# Patient Record
Sex: FEMALE | Race: WHITE | NOT HISPANIC OR LATINO | Employment: OTHER | URBAN - METROPOLITAN AREA
[De-identification: names, ages, dates, MRNs, and addresses within clinical notes are randomized per-mention and may not be internally consistent; named-entity substitution may affect disease eponyms.]

---

## 2017-05-12 DIAGNOSIS — Z13.820 ENCOUNTER FOR SCREENING FOR OSTEOPOROSIS: ICD-10-CM

## 2017-05-12 DIAGNOSIS — Z79.01 LONG TERM CURRENT USE OF ANTICOAGULANT: ICD-10-CM

## 2017-05-12 DIAGNOSIS — E78.2 MIXED HYPERLIPIDEMIA: ICD-10-CM

## 2017-05-12 DIAGNOSIS — D68.4 ACQUIRED COAGULATION FACTOR DEFICIENCY (HCC): ICD-10-CM

## 2017-05-12 DIAGNOSIS — R73.09 OTHER ABNORMAL GLUCOSE: ICD-10-CM

## 2017-05-12 DIAGNOSIS — R42 DIZZINESS AND GIDDINESS: ICD-10-CM

## 2017-05-12 DIAGNOSIS — I10 ESSENTIAL (PRIMARY) HYPERTENSION: ICD-10-CM

## 2017-05-23 ENCOUNTER — ALLSCRIPTS OFFICE VISIT (OUTPATIENT)
Dept: OTHER | Facility: OTHER | Age: 82
End: 2017-05-23

## 2017-05-26 ENCOUNTER — ALLSCRIPTS OFFICE VISIT (OUTPATIENT)
Dept: OTHER | Facility: OTHER | Age: 82
End: 2017-05-26

## 2017-07-11 ENCOUNTER — ALLSCRIPTS OFFICE VISIT (OUTPATIENT)
Dept: OTHER | Facility: OTHER | Age: 82
End: 2017-07-11

## 2017-10-02 DIAGNOSIS — D68.51 ACTIVATED PROTEIN C RESISTANCE (HCC): ICD-10-CM

## 2017-10-02 DIAGNOSIS — R73.09 OTHER ABNORMAL GLUCOSE: ICD-10-CM

## 2017-10-02 DIAGNOSIS — M15.9 POLYOSTEOARTHRITIS: ICD-10-CM

## 2017-10-02 DIAGNOSIS — R63.4 ABNORMAL WEIGHT LOSS: ICD-10-CM

## 2017-10-02 DIAGNOSIS — Z79.01 LONG TERM CURRENT USE OF ANTICOAGULANT: ICD-10-CM

## 2017-10-02 DIAGNOSIS — R20.0 ANESTHESIA OF SKIN: ICD-10-CM

## 2017-10-02 DIAGNOSIS — I10 ESSENTIAL (PRIMARY) HYPERTENSION: ICD-10-CM

## 2017-10-02 DIAGNOSIS — E78.2 MIXED HYPERLIPIDEMIA: ICD-10-CM

## 2017-10-18 ENCOUNTER — ALLSCRIPTS OFFICE VISIT (OUTPATIENT)
Dept: OTHER | Facility: OTHER | Age: 82
End: 2017-10-18

## 2017-12-15 ENCOUNTER — GENERIC CONVERSION - ENCOUNTER (OUTPATIENT)
Dept: OTHER | Facility: OTHER | Age: 82
End: 2017-12-15

## 2018-01-03 DIAGNOSIS — D68.51 ACTIVATED PROTEIN C RESISTANCE (HCC): ICD-10-CM

## 2018-01-12 VITALS
DIASTOLIC BLOOD PRESSURE: 70 MMHG | SYSTOLIC BLOOD PRESSURE: 130 MMHG | OXYGEN SATURATION: 97 % | WEIGHT: 131.56 LBS | TEMPERATURE: 97.4 F | HEIGHT: 63 IN | BODY MASS INDEX: 23.31 KG/M2 | RESPIRATION RATE: 17 BRPM | HEART RATE: 108 BPM

## 2018-01-13 VITALS
SYSTOLIC BLOOD PRESSURE: 100 MMHG | RESPIRATION RATE: 15 BRPM | OXYGEN SATURATION: 95 % | HEIGHT: 63 IN | HEART RATE: 100 BPM | TEMPERATURE: 99 F | WEIGHT: 126.13 LBS | BODY MASS INDEX: 22.35 KG/M2 | DIASTOLIC BLOOD PRESSURE: 60 MMHG

## 2018-01-13 VITALS
SYSTOLIC BLOOD PRESSURE: 140 MMHG | WEIGHT: 132.44 LBS | RESPIRATION RATE: 16 BRPM | BODY MASS INDEX: 23.46 KG/M2 | DIASTOLIC BLOOD PRESSURE: 70 MMHG | OXYGEN SATURATION: 96 % | HEART RATE: 94 BPM | TEMPERATURE: 96.7 F | HEIGHT: 63 IN

## 2018-01-15 VITALS
RESPIRATION RATE: 16 BRPM | HEART RATE: 105 BPM | SYSTOLIC BLOOD PRESSURE: 134 MMHG | BODY MASS INDEX: 16.36 KG/M2 | WEIGHT: 124 LBS | DIASTOLIC BLOOD PRESSURE: 70 MMHG | TEMPERATURE: 99 F | OXYGEN SATURATION: 96 %

## 2018-01-19 LAB — INR PPP: 1.6 (ref 0.86–1.16)

## 2018-01-22 ENCOUNTER — GENERIC CONVERSION - ENCOUNTER (OUTPATIENT)
Dept: OTHER | Facility: OTHER | Age: 83
End: 2018-01-22

## 2018-02-03 DIAGNOSIS — D68.9 COAGULATION DEFECT (HCC): Primary | ICD-10-CM

## 2018-02-04 RX ORDER — WARFARIN SODIUM 1 MG/1
TABLET ORAL
Qty: 72 TABLET | Refills: 5 | Status: ON HOLD | OUTPATIENT
Start: 2018-02-04 | End: 2018-03-15

## 2018-02-18 DIAGNOSIS — G89.29 OTHER CHRONIC PAIN: Primary | ICD-10-CM

## 2018-02-18 RX ORDER — TRAMADOL HYDROCHLORIDE 50 MG/1
TABLET ORAL
Qty: 30 TABLET | Refills: 0 | OUTPATIENT
Start: 2018-02-18 | End: 2018-03-20 | Stop reason: SDUPTHER

## 2018-03-02 DIAGNOSIS — F41.9 ANXIETY: Primary | ICD-10-CM

## 2018-03-03 RX ORDER — LORAZEPAM 1 MG/1
TABLET ORAL
Qty: 60 TABLET | Refills: 0 | OUTPATIENT
Start: 2018-03-03 | End: 2018-03-13 | Stop reason: SDUPTHER

## 2018-03-13 DIAGNOSIS — F41.9 ANXIETY: ICD-10-CM

## 2018-03-14 ENCOUNTER — TELEPHONE (OUTPATIENT)
Dept: FAMILY MEDICINE CLINIC | Facility: CLINIC | Age: 83
End: 2018-03-14

## 2018-03-14 ENCOUNTER — APPOINTMENT (EMERGENCY)
Dept: RADIOLOGY | Facility: HOSPITAL | Age: 83
DRG: 813 | End: 2018-03-14
Payer: MEDICARE

## 2018-03-14 ENCOUNTER — HOSPITAL ENCOUNTER (INPATIENT)
Facility: HOSPITAL | Age: 83
LOS: 1 days | Discharge: HOME | DRG: 813 | End: 2018-03-15
Attending: EMERGENCY MEDICINE | Admitting: INTERNAL MEDICINE
Payer: MEDICARE

## 2018-03-14 DIAGNOSIS — D68.9 COAGULATION DEFECT (HCC): ICD-10-CM

## 2018-03-14 DIAGNOSIS — R79.1 ELEVATED INR: Primary | ICD-10-CM

## 2018-03-14 DIAGNOSIS — K92.2 GI BLEED: ICD-10-CM

## 2018-03-14 PROBLEM — J45.909 ASTHMA: Status: ACTIVE | Noted: 2018-03-14

## 2018-03-14 PROBLEM — D68.51 FACTOR V LEIDEN (HCC): Status: ACTIVE | Noted: 2018-03-14

## 2018-03-14 LAB
ABO GROUP BLD: NORMAL
ALBUMIN SERPL BCP-MCNC: 2.8 G/DL (ref 3.5–5)
ALP SERPL-CCNC: 86 U/L (ref 46–116)
ALT SERPL W P-5'-P-CCNC: 14 U/L (ref 12–78)
ANION GAP SERPL CALCULATED.3IONS-SCNC: 11 MMOL/L (ref 4–13)
APTT PPP: 123 SECONDS (ref 23–35)
AST SERPL W P-5'-P-CCNC: 24 U/L (ref 5–45)
ATRIAL RATE: 94 BPM
BASOPHILS # BLD AUTO: 0.01 THOUSANDS/ΜL (ref 0–0.1)
BASOPHILS NFR BLD AUTO: 0 % (ref 0–1)
BILIRUB SERPL-MCNC: 0.82 MG/DL (ref 0.2–1)
BLD GP AB SCN SERPL QL: NEGATIVE
BUN SERPL-MCNC: 31 MG/DL (ref 5–25)
CALCIUM SERPL-MCNC: 8.4 MG/DL (ref 8.3–10.1)
CHLORIDE SERPL-SCNC: 104 MMOL/L (ref 100–108)
CO2 SERPL-SCNC: 25 MMOL/L (ref 21–32)
CREAT SERPL-MCNC: 1.06 MG/DL (ref 0.6–1.3)
EOSINOPHIL # BLD AUTO: 0.14 THOUSAND/ΜL (ref 0–0.61)
EOSINOPHIL NFR BLD AUTO: 2 % (ref 0–6)
ERYTHROCYTE [DISTWIDTH] IN BLOOD BY AUTOMATED COUNT: 15.8 % (ref 11.6–15.1)
GFR SERPL CREATININE-BSD FRML MDRD: 46 ML/MIN/1.73SQ M
GLUCOSE SERPL-MCNC: 104 MG/DL (ref 65–140)
HCT VFR BLD AUTO: 28.6 % (ref 34.8–46.1)
HGB BLD-MCNC: 9.2 G/DL (ref 11.5–15.4)
INR PPP: 12.07 (ref 0.86–1.16)
INR PPP: 3.75 (ref 0.86–1.16)
LYMPHOCYTES # BLD AUTO: 2.12 THOUSANDS/ΜL (ref 0.6–4.47)
LYMPHOCYTES NFR BLD AUTO: 30 % (ref 14–44)
MCH RBC QN AUTO: 31.3 PG (ref 26.8–34.3)
MCHC RBC AUTO-ENTMCNC: 32.2 G/DL (ref 31.4–37.4)
MCV RBC AUTO: 97 FL (ref 82–98)
MONOCYTES # BLD AUTO: 0.66 THOUSAND/ΜL (ref 0.17–1.22)
MONOCYTES NFR BLD AUTO: 9 % (ref 4–12)
NEUTROPHILS # BLD AUTO: 4.16 THOUSANDS/ΜL (ref 1.85–7.62)
NEUTS SEG NFR BLD AUTO: 59 % (ref 43–75)
NRBC BLD AUTO-RTO: 0 /100 WBCS
P AXIS: 60 DEGREES
PLATELET # BLD AUTO: 222 THOUSANDS/UL (ref 149–390)
PMV BLD AUTO: 9.3 FL (ref 8.9–12.7)
POTASSIUM SERPL-SCNC: 3.5 MMOL/L (ref 3.5–5.3)
PR INTERVAL: 134 MS
PROT SERPL-MCNC: 6.2 G/DL (ref 6.4–8.2)
PROTHROMBIN TIME: 37.7 SECONDS (ref 12.1–14.4)
PROTHROMBIN TIME: 95.3 SECONDS (ref 12.1–14.4)
QRS AXIS: 52 DEGREES
QRSD INTERVAL: 72 MS
QT INTERVAL: 346 MS
QTC INTERVAL: 432 MS
RBC # BLD AUTO: 2.94 MILLION/UL (ref 3.81–5.12)
RH BLD: POSITIVE
SODIUM SERPL-SCNC: 140 MMOL/L (ref 136–145)
SPECIMEN EXPIRATION DATE: NORMAL
T WAVE AXIS: 23 DEGREES
TROPONIN I SERPL-MCNC: <0.02 NG/ML
VENTRICULAR RATE: 94 BPM
WBC # BLD AUTO: 7.09 THOUSAND/UL (ref 4.31–10.16)

## 2018-03-14 PROCEDURE — 99284 EMERGENCY DEPT VISIT MOD MDM: CPT

## 2018-03-14 PROCEDURE — 93005 ELECTROCARDIOGRAM TRACING: CPT

## 2018-03-14 PROCEDURE — 84484 ASSAY OF TROPONIN QUANT: CPT | Performed by: EMERGENCY MEDICINE

## 2018-03-14 PROCEDURE — 86901 BLOOD TYPING SEROLOGIC RH(D): CPT | Performed by: EMERGENCY MEDICINE

## 2018-03-14 PROCEDURE — 30233K1 TRANSFUSION OF NONAUTOLOGOUS FROZEN PLASMA INTO PERIPHERAL VEIN, PERCUTANEOUS APPROACH: ICD-10-PCS | Performed by: INTERNAL MEDICINE

## 2018-03-14 PROCEDURE — 80053 COMPREHEN METABOLIC PANEL: CPT | Performed by: EMERGENCY MEDICINE

## 2018-03-14 PROCEDURE — 86850 RBC ANTIBODY SCREEN: CPT | Performed by: EMERGENCY MEDICINE

## 2018-03-14 PROCEDURE — 36415 COLL VENOUS BLD VENIPUNCTURE: CPT | Performed by: EMERGENCY MEDICINE

## 2018-03-14 PROCEDURE — 71046 X-RAY EXAM CHEST 2 VIEWS: CPT

## 2018-03-14 PROCEDURE — 85610 PROTHROMBIN TIME: CPT | Performed by: EMERGENCY MEDICINE

## 2018-03-14 PROCEDURE — 85025 COMPLETE CBC W/AUTO DIFF WBC: CPT | Performed by: EMERGENCY MEDICINE

## 2018-03-14 PROCEDURE — P9017 PLASMA 1 DONOR FRZ W/IN 8 HR: HCPCS

## 2018-03-14 PROCEDURE — 99223 1ST HOSP IP/OBS HIGH 75: CPT | Performed by: INTERNAL MEDICINE

## 2018-03-14 PROCEDURE — 86927 PLASMA FRESH FROZEN: CPT

## 2018-03-14 PROCEDURE — 85610 PROTHROMBIN TIME: CPT | Performed by: PHYSICIAN ASSISTANT

## 2018-03-14 PROCEDURE — 93010 ELECTROCARDIOGRAM REPORT: CPT | Performed by: INTERNAL MEDICINE

## 2018-03-14 PROCEDURE — 86900 BLOOD TYPING SEROLOGIC ABO: CPT | Performed by: EMERGENCY MEDICINE

## 2018-03-14 PROCEDURE — 85730 THROMBOPLASTIN TIME PARTIAL: CPT | Performed by: EMERGENCY MEDICINE

## 2018-03-14 RX ORDER — TRAMADOL HYDROCHLORIDE 50 MG/1
50 TABLET ORAL DAILY PRN
Status: DISCONTINUED | OUTPATIENT
Start: 2018-03-14 | End: 2018-03-15 | Stop reason: HOSPADM

## 2018-03-14 RX ORDER — TRAMADOL HYDROCHLORIDE 50 MG/1
TABLET ORAL
COMMUNITY
Start: 2016-05-10 | End: 2018-03-14

## 2018-03-14 RX ORDER — CALCIUM CARBONATE 200(500)MG
1000 TABLET,CHEWABLE ORAL DAILY PRN
Status: DISCONTINUED | OUTPATIENT
Start: 2018-03-14 | End: 2018-03-15 | Stop reason: HOSPADM

## 2018-03-14 RX ORDER — ONDANSETRON 2 MG/ML
4 INJECTION INTRAMUSCULAR; INTRAVENOUS EVERY 6 HOURS PRN
Status: DISCONTINUED | OUTPATIENT
Start: 2018-03-14 | End: 2018-03-15 | Stop reason: HOSPADM

## 2018-03-14 RX ORDER — ACETAMINOPHEN 325 MG/1
650 TABLET ORAL EVERY 6 HOURS PRN
Status: DISCONTINUED | OUTPATIENT
Start: 2018-03-14 | End: 2018-03-15 | Stop reason: HOSPADM

## 2018-03-14 RX ORDER — ATORVASTATIN CALCIUM 20 MG/1
1 TABLET, FILM COATED ORAL DAILY
COMMUNITY
Start: 2011-04-20 | End: 2018-04-25 | Stop reason: SDUPTHER

## 2018-03-14 RX ORDER — PHYTONADIONE 5 MG/1
5 TABLET ORAL ONCE
Status: COMPLETED | OUTPATIENT
Start: 2018-03-14 | End: 2018-03-14

## 2018-03-14 RX ORDER — LISINOPRIL 10 MG/1
10 TABLET ORAL DAILY
Status: DISCONTINUED | OUTPATIENT
Start: 2018-03-14 | End: 2018-03-15 | Stop reason: HOSPADM

## 2018-03-14 RX ORDER — ALBUTEROL SULFATE 90 UG/1
2 AEROSOL, METERED RESPIRATORY (INHALATION) EVERY 6 HOURS PRN
Status: DISCONTINUED | OUTPATIENT
Start: 2018-03-14 | End: 2018-03-15 | Stop reason: HOSPADM

## 2018-03-14 RX ORDER — HYDRALAZINE HYDROCHLORIDE 20 MG/ML
10 INJECTION INTRAMUSCULAR; INTRAVENOUS EVERY 6 HOURS PRN
Status: DISCONTINUED | OUTPATIENT
Start: 2018-03-14 | End: 2018-03-15 | Stop reason: HOSPADM

## 2018-03-14 RX ORDER — ATORVASTATIN CALCIUM 10 MG/1
20 TABLET, FILM COATED ORAL
Status: DISCONTINUED | OUTPATIENT
Start: 2018-03-14 | End: 2018-03-15 | Stop reason: HOSPADM

## 2018-03-14 RX ORDER — LORAZEPAM 1 MG/1
TABLET ORAL
Qty: 60 TABLET | Refills: 0 | OUTPATIENT
Start: 2018-03-14 | End: 2018-03-19 | Stop reason: SDUPTHER

## 2018-03-14 RX ORDER — LISINOPRIL 10 MG/1
1 TABLET ORAL DAILY
COMMUNITY
Start: 2011-04-21 | End: 2018-03-16 | Stop reason: SDUPTHER

## 2018-03-14 RX ORDER — FLUTICASONE PROPIONATE 110 UG/1
2 AEROSOL, METERED RESPIRATORY (INHALATION) 2 TIMES DAILY
Status: DISCONTINUED | OUTPATIENT
Start: 2018-03-14 | End: 2018-03-15 | Stop reason: HOSPADM

## 2018-03-14 RX ORDER — ALBUTEROL SULFATE 90 UG/1
AEROSOL, METERED RESPIRATORY (INHALATION)
COMMUNITY
Start: 2011-04-07 | End: 2018-10-17 | Stop reason: SDUPTHER

## 2018-03-14 RX ORDER — SALMETEROL XINAFOATE 50 MCG
1 BLISTER, WITH INHALATION DEVICE INHALATION EVERY 12 HOURS
COMMUNITY
Start: 2018-02-20

## 2018-03-14 RX ORDER — LORAZEPAM 1 MG/1
1 TABLET ORAL EVERY 12 HOURS PRN
Status: DISCONTINUED | OUTPATIENT
Start: 2018-03-14 | End: 2018-03-15 | Stop reason: HOSPADM

## 2018-03-14 RX ORDER — FLUTICASONE PROPIONATE 110 UG/1
2 AEROSOL, METERED RESPIRATORY (INHALATION) 2 TIMES DAILY
COMMUNITY
Start: 2010-11-15 | End: 2018-06-18 | Stop reason: SDUPTHER

## 2018-03-14 RX ORDER — PANTOPRAZOLE SODIUM 40 MG/1
40 TABLET, DELAYED RELEASE ORAL
Status: DISCONTINUED | OUTPATIENT
Start: 2018-03-14 | End: 2018-03-15 | Stop reason: HOSPADM

## 2018-03-14 RX ADMIN — ATORVASTATIN CALCIUM 20 MG: 10 TABLET, FILM COATED ORAL at 17:53

## 2018-03-14 RX ADMIN — LISINOPRIL 10 MG: 10 TABLET ORAL at 12:36

## 2018-03-14 RX ADMIN — PANTOPRAZOLE SODIUM 40 MG: 40 TABLET, DELAYED RELEASE ORAL at 12:36

## 2018-03-14 RX ADMIN — FLUTICASONE PROPIONATE 2 PUFF: 110 AEROSOL, METERED RESPIRATORY (INHALATION) at 17:53

## 2018-03-14 RX ADMIN — PHYTONADIONE 5 MG: 5 TABLET ORAL at 08:12

## 2018-03-14 RX ADMIN — SALMETEROL XINAFOATE 1 PUFF: 50 POWDER, METERED ORAL; RESPIRATORY (INHALATION) at 20:34

## 2018-03-14 NOTE — ED PROVIDER NOTES
History  Chief Complaint   Patient presents with    Abnormal Lab     pt presents for evaluation of INR, reports PCP reports INR of 12  pt c/o dry mouth, son reports flu like symptoms for 1 week  79-year-old female with a history of asthma, DVT on Coumadin presents to the emergency department with elevated INR  Patient had routine blood work done yesterday and was called this morning and told to come to the emergency department because her INR was 12  Patient has noticed some blood in her mouth but no other bleeding or bruising  No dark stools  She has never had a history of elevated INR  She had blood work done in January which showed she had a low INR and at that time her Coumadin was increased on Thursday, Saturday and  to 3 milligrams up from 2 milligrams  On Monday, Wednesday and Friday she takes 2 milligrams  She states she did not get her INR checked last month because she has been battling flu like symptoms  She states for the last month she eats very little  She denies vomiting or diarrhea  No fevers or chills  History provided by:  Patient and relative   used: No        Prior to Admission Medications   Prescriptions Last Dose Informant Patient Reported? Taking?    LORazepam (ATIVAN) 1 mg tablet 3/13/2018 at Unknown time  No Yes   Sig: TAKE ONE TABLET BY MOUTH EVERY TWELVE HOURS AS NEEDED    SEREVENT DISKUS 50 MCG/DOSE diskus inhaler 3/13/2018 at Unknown time  Yes Yes   Si puff every 12 (twelve) hours   albuterol (VENTOLIN HFA) 90 mcg/act inhaler Unknown at Unknown time  Yes No   Sig: Inhale   atorvastatin (LIPITOR) 20 mg tablet 3/13/2018 at Unknown time  Yes Yes   Sig: Take 1 tablet by mouth daily   diclofenac sodium (VOLTAREN) 1 % More than a month at Unknown time  Yes No   Sig: Place on the skin   fluticasone (FLOVENT HFA) 110 MCG/ACT inhaler 3/13/2018 at Unknown time  Yes Yes   Sig: Inhale 2 puffs 2 (two) times a day   lisinopril (ZESTRIL) 10 mg tablet More than a month at Unknown time  Yes No   Sig: Take 1 tablet by mouth daily   traMADol (ULTRAM) 50 mg tablet Past Month at Unknown time  No Yes   Sig: TAKE ONE TABLET BY MOUTH ONE TIME DAILY IN THE EVENING AS NEEDED   warfarin (JANTOVEN) 1 mg tablet 3/13/2018 at Unknown time  No Yes   Sig: Take 2 tabs on Mon/Weds/Fri and 3 tabs on all other days  Facility-Administered Medications: None       Past Medical History:   Diagnosis Date    Asthma     COPD (chronic obstructive pulmonary disease) ([facility] )     DVT (deep venous thrombosis) ([place] [address] )        History reviewed  No pertinent surgical history  History reviewed  No pertinent family history  I have reviewed and agree with the history as documented  Social History   Substance Use Topics    Smoking status: Never Smoker    Smokeless tobacco: Never Used    Alcohol use No        Review of Systems   Constitutional: Positive for activity change, appetite change and fatigue  Negative for chills, diaphoresis, fever and unexpected weight change  HENT: Negative  Negative for congestion, rhinorrhea and sore throat  Eyes: Negative  Negative for discharge, redness and itching  Respiratory: Negative for apnea, chest tightness, shortness of breath and wheezing  Cardiovascular: Negative for chest pain, palpitations and leg swelling  Gastrointestinal: Negative  Negative for abdominal pain  Endocrine: Negative  Genitourinary: Negative  Negative for flank pain, frequency and urgency  Musculoskeletal: Negative  Negative for back pain  Skin: Negative  Allergic/Immunologic: Negative  Neurological: Negative  Negative for dizziness, syncope, weakness, light-headedness, numbness and headaches  Hematological: Negative  All other systems reviewed and are negative        Physical Exam  ED Triage Vitals   Temperature Pulse Respirations Blood Pressure SpO2   03/14/18 1048 03/14/18 0656 03/14/18 0656 03/14/18 0656 03/14/18 0656   97 6 °F (36 4 °C) (!) 110 17 (!) 194/84 95 %      Temp Source Heart Rate Source Patient Position - Orthostatic VS BP Location FiO2 (%)   03/14/18 1048 03/14/18 0656 03/14/18 0656 03/14/18 0656 --   Tympanic Monitor Lying Right arm       Pain Score       03/14/18 0812       No Pain           Orthostatic Vital Signs  Vitals:    03/14/18 1445 03/14/18 1500 03/14/18 1515 03/14/18 1545   BP: 117/55 120/56 105/51 95/51   Pulse: 88 86 86 88   Patient Position - Orthostatic VS:           Physical Exam   Constitutional: She is oriented to person, place, and time  She appears well-developed and well-nourished  Non-toxic appearance  She does not have a sickly appearance  She does not appear ill  No distress  HENT:   Head: Normocephalic and atraumatic  Right Ear: External ear normal    Left Ear: External ear normal    Mouth/Throat: No oropharyngeal exudate  Dried blood in mouth   Eyes: Conjunctivae and EOM are normal  Pupils are equal, round, and reactive to light  Right eye exhibits no discharge  Left eye exhibits no discharge  No scleral icterus  Neck: Normal range of motion  Neck supple  Cardiovascular: Normal rate, regular rhythm and normal heart sounds  Exam reveals no gallop and no friction rub  No murmur heard  Pulmonary/Chest: Effort normal and breath sounds normal  No stridor  No respiratory distress  She has no wheezes  She has no rales  She exhibits no tenderness  Abdominal: Soft  Bowel sounds are normal  She exhibits no distension and no mass  There is no tenderness  No hernia  Genitourinary: Rectal exam shows guaiac positive stool (dark heme positive)  Musculoskeletal: Normal range of motion  She exhibits no edema, tenderness or deformity  Lymphadenopathy:     She has no cervical adenopathy  Neurological: She is alert and oriented to person, place, and time  She has normal reflexes  She exhibits normal muscle tone  Skin: Skin is warm and dry  No rash noted  She is not diaphoretic  No erythema   No pallor  Psychiatric: She has a normal mood and affect  Nursing note and vitals reviewed  ED Medications  Medications   traMADol (ULTRAM) tablet 50 mg (not administered)   LORazepam (ATIVAN) tablet 1 mg (not administered)   atorvastatin (LIPITOR) tablet 20 mg (not administered)   fluticasone (FLOVENT HFA) 110 MCG/ACT inhaler 2 puff (not administered)   lisinopril (ZESTRIL) tablet 10 mg (10 mg Oral Given 3/14/18 1236)   salmeterol (SEREVENT) 50 mcg/dose inhaler 1 puff (1 puff Inhalation Not Given 3/14/18 1235)   albuterol (PROVENTIL HFA,VENTOLIN HFA) inhaler 2 puff (not administered)   ondansetron (ZOFRAN) injection 4 mg (not administered)   calcium carbonate (TUMS) chewable tablet 1,000 mg (not administered)   acetaminophen (TYLENOL) tablet 650 mg (not administered)   pantoprazole (PROTONIX) EC tablet 40 mg (40 mg Oral Given 3/14/18 1236)   hydrALAZINE (APRESOLINE) injection 10 mg (not administered)   phytonadione (MEPHYTON) tablet 5 mg (5 mg Oral Given 3/14/18 0812)       Diagnostic Studies  Results Reviewed     Procedure Component Value Units Date/Time    APTT [[id number]]  (Abnormal) Collected:  03/14/18 0716    Lab Status:  Final result Specimen:  Blood from Arm, Right Updated:  03/14/18 0917      (HH) seconds     Narrative:          Therapeutic Heparin Range = 60-90 seconds  Therapeutic Heparin Range = 60-90 seconds  Therapeutic Heparin Range = 60-90 seconds    Protime-INR [[id number]]  (Abnormal) Collected:  03/14/18 0716    Lab Status:  Final result Specimen:  Blood from Arm, Right Updated:  03/14/18 0751     Protime 95 3 (H) seconds      INR 12 07 [facility] AT [facility])    Comprehensive metabolic panel [[id number]]  (Abnormal) Collected:  03/14/18 0716    Lab Status:  Final result Specimen:  Blood from Arm, Right Updated:  03/14/18 0742     Sodium 140 mmol/L      Potassium 3 5 mmol/L      Chloride 104 mmol/L      CO2 25 mmol/L      Anion Gap 11 mmol/L      BUN 31 (H) mg/dL      Creatinine 1 06 mg/dL      Glucose 104 mg/dL      Calcium 8 4 mg/dL      AST 24 U/L      ALT 14 U/L      Alkaline Phosphatase 86 U/L      Total Protein 6 2 (L) g/dL      Albumin 2 8 (L) g/dL      Total Bilirubin 0 82 mg/dL      eGFR 46 ml/min/1 73sq m     Narrative:         National Kidney Disease Education Program recommendations are as follows:  GFR calculation is accurate only with a steady state creatinine  Chronic Kidney disease less than 60 ml/min/1 73 sq  meters  Kidney failure less than 15 ml/min/1 73 sq  meters  Troponin I [[id number]]  (Normal) Collected:  03/14/18 0716    Lab Status:  Final result Specimen:  Blood from Arm, Right Updated:  03/14/18 0738     Troponin I <0 02 ng/mL     Narrative:         [employer] Chemistry analyzer 99% cutoff is > 0 04 ng/mL in network labs    o cTnI 99% cutoff is useful only when applied to patients in the clinical setting of myocardial ischemia  o cTnI 99% cutoff should be interpreted in the context of clinical history, ECG findings and possibly cardiac imaging to establish correct diagnosis  o cTnI 99% cutoff may be suggestive but clearly not indicative of a coronary event without the clinical setting of myocardial ischemia      CBC and differential [[id number]]  (Abnormal) Collected:  03/14/18 0716    Lab Status:  Final result Specimen:  Blood from Arm, Right Updated:  03/14/18 0722     WBC 7 09 Thousand/uL      RBC 2 94 (L) Million/uL      Hemoglobin 9 2 (L) g/dL      Hematocrit 28 6 (L) %      MCV 97 fL      MCH 31 3 pg      MCHC 32 2 g/dL      RDW 15 8 (H) %      MPV 9 3 fL      Platelets 737 Thousands/uL      nRBC 0 /100 WBCs      Neutrophils Relative 59 %      Lymphocytes Relative 30 %      Monocytes Relative 9 %      Eosinophils Relative 2 %      Basophils Relative 0 %      Neutrophils Absolute 4 16 Thousands/µL      Lymphocytes Absolute 2 12 Thousands/µL      Monocytes Absolute 0 66 Thousand/µL      Eosinophils Absolute 0 14 Thousand/µL      Basophils Absolute 0 01 Thousands/µL     POCT urinalysis dipstick [[id number]]     Lab Status:  No result Specimen:  Urine                  XR chest 2 views   ED Interpretation by Toney Gaspar DO (03/14 5007)   nad      Final Result by Louise Gambino MD (03/14 7375)      COPD  Prominent atherosclerotic disease of aorta  No acute findings            Workstation performed: [id number]                    Procedures  Procedures       Phone Contacts  ED Phone Contact    ED Course  ED Course                                MDM  Number of Diagnoses or Management Options  Diagnosis management comments: 80-year-old female presents to the emergency department with elevated INR  This was found on routine labs done yesterday  She has noticed some blood in her mouth but no other signs of bleeding at home  No history of elevated INR in the past   She states that she has been ill over the last month with flu like symptoms and fatigue  She has had a decreased appetite and has not been eating very much  She did have her Coumadin increased on some days of the week in January  On exam she does not appear to be in any acute distress  She does have some dried blood in her mouth but the rest of her exam is normal   Will check CBC to rule out anemia, will check INR to assure that this is not a lab error  Will check chemistry and type and screen    If the INR is elevated will give vitamin K and possibly fresh frozen plasma       Amount and/or Complexity of Data Reviewed  Clinical lab tests: ordered and reviewed  Tests in the radiology section of CPT®: ordered and reviewed  Decide to obtain previous medical records or to obtain history from someone other than the patient: yes  Review and summarize past medical records: yes  Independent visualization of images, tracings, or specimens: yes      CritCare Time    Disposition  Final diagnoses:   Elevated INR   GI bleed     Time reflects when diagnosis was documented in both MDM as applicable and the Disposition within this note Time User Action Codes Description Comment    3/14/2018  9:13 AM Lusukhdeep STEVE Add [R79 1] Elevated INR     3/14/2018  9:13 AM Rob STEVE Add [K92 2] GI bleed       ED Disposition     ED Disposition Condition Comment    Admit  Case was discussed with myrna and the patient's admission status was agreed to be Admission Status: inpatient status to the service of Dr Emily Pablo   Follow-up Information    None       Current Discharge Medication List      CONTINUE these medications which have NOT CHANGED    Details   atorvastatin (LIPITOR) 20 mg tablet Take 1 tablet by mouth daily      fluticasone (FLOVENT HFA) 110 MCG/ACT inhaler Inhale 2 puffs 2 (two) times a day      LORazepam (ATIVAN) 1 mg tablet TAKE ONE TABLET BY MOUTH EVERY TWELVE HOURS AS NEEDED   Qty: 60 tablet, Refills: 0    Associated Diagnoses: Anxiety      SEREVENT DISKUS 50 MCG/DOSE diskus inhaler 1 puff every 12 (twelve) hours      traMADol (ULTRAM) 50 mg tablet TAKE ONE TABLET BY MOUTH ONE TIME DAILY IN THE EVENING AS NEEDED  Qty: 30 tablet, Refills: 0    Associated Diagnoses: Other chronic pain      warfarin (JANTOVEN) 1 mg tablet Take 2 tabs on Mon/Weds/Fri and 3 tabs on all other days  Qty: 72 tablet, Refills: 5    Associated Diagnoses: Coagulation defect (HCC)      albuterol (VENTOLIN HFA) 90 mcg/act inhaler Inhale      diclofenac sodium (VOLTAREN) 1 % Place on the skin      lisinopril (ZESTRIL) 10 mg tablet Take 1 tablet by mouth daily           No discharge procedures on file      ED Provider  Electronically Signed by           Marlin Stinson DO  03/14/18 0852

## 2018-03-14 NOTE — ED PROCEDURE NOTE
PROCEDURE  ECG 12 Lead Documentation  Date/Time: 3/14/2018 7:20 AM  Performed by: Tory Somers  Authorized by: Tory Somers     ECG reviewed by me, the ED Provider: yes    Patient location:  ED  Interpretation:     Interpretation: normal    Rate:     ECG rate:  94    ECG rate assessment: normal    Rhythm:     Rhythm: sinus rhythm    Ectopy:     Ectopy: none    QRS:     QRS axis:  Normal  Conduction:     Conduction: normal    ST segments:     ST segments:  Normal  T waves:     T waves: normal           Sarah Nelson DO  03/14/18 0720

## 2018-03-14 NOTE — H&P
History and Physical - [facility] Internal Medicine    Patient Information: Otoniel Booker 80 y o  female MRN: [id number]  Unit/Bed#: ED 23 Encounter: [id number]  Admitting Physician: Adalberto Levy PA-C  PCP: Collette Lin, DO  Date of Admission:  03/14/18    Assessment/Plan  Patient is a very pleasant 51-year-old female with a known history of factor 5 Leiden  She has been on Coumadin which is checked monthly by her primary care doctor  However in February the patient had bout of the flu and had not checked her INR in the month of February  She went yesterday and was reported back at 12 4  Her primary care doctor called her to go to the ER immediately  She presented to [facility] and repeat INR was 12 0  The patient noticed some dry blood in her mouth and some bruising but no overt bleeding  She denied any dark or tarry stools  In the ER the patient's hemoglobin was 9 2  The rest of her labs except for the INR was her within acceptable limits  Her initial blood pressure was 553 systolic it has since come down  Chest x-ray showed no active pulmonary disease  And she was noted to be heme-positive with dark brown stool    Hospital Problem List:     Principal Problem:    Coagulopathy ([facility] 75 )  Active Problems:    Factor V Leiden ([facility] )    Asthma    GI bleed      Plan for the Primary Problem(s):  1  Coagulopathy - with an INR of 12 0  Fortunately the patient is not having any gross bleeding events  However she did have dark stools on the rectal exam that were heme-positive  The patient denies any abdominal pain  In the ER the patient was given vitamin K 5 mg p o  and ordered 2 units of fresh frozen plasma  We will repeat an INR in 6 hours, and if still greater than 5 we will consider another 2 units of fresh frozen plasma  · Consent for blood products was obtained by the ER provider Dr Layne Will for Additional Problems:   2    Possible GI bleed - noted to be heme-positive with dark brown stool in the ER  Hemoglobin is 9 2, in October 2017 is 13 5  The patient's abdomen is benign  The patient has a remote history of a colonoscopy without any findings  The patient declines a GI consult at this time and she would not be interested in a colonoscopy or an EGD given her advanced age  PPI po b i d   3   Asthma-no exacerbation continue Ventolin and Serevent  4  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 present on admission, repeat 118/74 without intervention  Continue lisinopril  Add hydralazine p r n   5   Anxiety-Ativan p r n   6   Osteoarthritis-tramadol & APAP  Patient denies NSAID use      VTE Prophylaxis: Warfarin (Coumadin) supratherapeutic  Code Status:  Full code    Anticipated Length of Stay:  Patient will be admitted on an Inpatient basis with an anticipated length of stay of  Greater than 2 midnights given the Coumadin coagulopathy and heme-positive suspicious for GI bleed       Total Time for Visit, including Counseling / Coordination of Care: 45 minutes  Greater than 50% of this total time spent on direct patient counseling and coordination of care  Chief Complaint:     Abnormal INR    History of Present Illness:    Ame Hennessy is a 80 y o  female who presents at the direction of her family doctor  The patient was called this morning that her INR was 12 and she should go to the ER immediately  The patient has noticed some dried blood around her lips she thought she just had chapped lips  She also knows that there was some bleeding in her gums when she brushes her teeth  She denies any other bleeding events  She has not noticed any dark or tarry stools or any hematuria  She did have a fall a few days ago when she was lightheaded and dizzy and she bruised her backside and behind her right knee  She denies any pain she did not hit her head at that time she was able to ambulate  The patient had a recent bout of the flu med is since resolved    She denies any fevers or chills  But she has had a poor appetite after she has had the flu  She denies any abdominal pain nausea or vomiting  She further denies any fevers chills substernal chest pain shortness of breath or palpitations    Review of Systems:  A 12-point review of systems was done  Please see the HPI for the full details  All other systems negative  Past Medical and Surgical History:     Past Medical History:   Diagnosis Date    Asthma     COPD (chronic obstructive pulmonary disease) ([facility] )     DVT (deep venous thrombosis) ([place] [address] )        History reviewed  No pertinent surgical history  Meds/Allergies:    No current facility-administered medications for this encounter  Current Outpatient Prescriptions   Medication Sig Dispense Refill    albuterol (VENTOLIN HFA) 90 mcg/act inhaler Inhale      atorvastatin (LIPITOR) 20 mg tablet Take 1 tablet by mouth daily      diclofenac sodium (VOLTAREN) 1 % Place on the skin      fluticasone (FLOVENT HFA) 110 MCG/ACT inhaler Inhale 2 puffs 2 (two) times a day      lisinopril (ZESTRIL) 10 mg tablet Take 1 tablet by mouth daily      LORazepam (ATIVAN) 1 mg tablet TAKE ONE TABLET BY MOUTH EVERY TWELVE HOURS AS NEEDED  60 tablet 0    SEREVENT DISKUS 50 MCG/DOSE diskus inhaler 1 puff every 12 (twelve) hours      traMADol (ULTRAM) 50 mg tablet TAKE ONE TABLET BY MOUTH ONE TIME DAILY IN THE EVENING AS NEEDED 30 tablet 0    warfarin (JANTOVEN) 1 mg tablet Take 2 tabs on Mon/Weds/Fri and 3 tabs on all other days  72 tablet 5     Allergies   Allergen Reactions    Cat Hair Extract     Ciprofloxacin Hives    Dog Epithelium     Erythromycin Nausea Only    Sulfa Antibiotics Edema     History:     Marital Status:      Substance Use History:   History   Alcohol Use No     History   Smoking Status    Never Smoker   Smokeless Tobacco    Never Used     History   Drug Use No       Family History:    History reviewed  No pertinent family history      Physical Exam: Vitals:   Blood Pressure: 118/74 (03/14/18 0812)  Pulse: 86 (03/14/18 0812)  Respirations: 16 (03/14/18 0812)  Weight - Scale: 54 8 kg (120 lb 13 oz) (03/14/18 0656)  SpO2: 95 % (03/14/18 0812)    General Appearance:    Alert, cooperative, no distress, appears stated age   HEENT:     Mucous membranes dry without   Exudates, lips with dried blood externally   Neck:   Supple, symmetrical, trachea midline, no adenopathy;     thyroid:  no enlargement/tenderness/nodules; no carotid    bruit or JVD   Lungs:     Clear to auscultation bilaterally, respirations unlabored   Chest Wall:    No tenderness or deformity    Heart:    Regular rate and rhythm, S1 and S2 normal, no murmur, rub    or gallop   Abdomen:     Soft, non-tender, bowel sounds active all four quadrants,     no masses, no organomegaly  Rectal: dark brown stool, heme positive per ER provider   Extremities:   Extremities normal, atraumatic, no cyanosis or edema   Pulses:   2+ and symmetric all extremities   Skin:   Try poor turgor good capillary refill diffuse ecchymosis on upper extremities and brawny Nissen her lower extremities without edema  Posterior right knee large ecchymosis nontender and faint ecchymosis on right butt cheek, no rashes or lesions   Lymph nodes:   Cervical, supraclavicular, and axillary nodes normal   Neurologic:   CNII-XII intact, weak/deconditioned       Lab Results: I have personally reviewed pertinent reports          Results from last 7 days  Lab Units 03/14/18  0716   WBC Thousand/uL 7 09   HEMOGLOBIN g/dL 9 2*   HEMATOCRIT % 28 6*   PLATELETS Thousands/uL 222   NEUTROS PCT % 59   LYMPHS PCT % 30   MONOS PCT % 9   EOS PCT % 2       Results from last 7 days  Lab Units 03/14/18  0716   SODIUM mmol/L 140   POTASSIUM mmol/L 3 5   CHLORIDE mmol/L 104   CO2 mmol/L 25   BUN mg/dL 31*   CREATININE mg/dL 1 06   CALCIUM mg/dL 8 4   TOTAL PROTEIN g/dL 6 2*   BILIRUBIN TOTAL mg/dL 0 82   ALK PHOS U/L 86   ALT U/L 14   AST U/L 24   GLUCOSE RANDOM mg/dL 104       Results from last 7 days  Lab Units 03/14/18  0716   INR  12 07*       Imaging: I have personally reviewed pertinent reports  Xr Chest 2 Views    Result Date: 3/14/2018  Narrative: CHEST INDICATION:   History of COPD  Recent history of influenza  History of left breast cancer 15 years ago  Former smoker  COMPARISON:  None EXAM PERFORMED/VIEWS:  XR CHEST PA & LATERAL Images: 3 FINDINGS:  There is prominent kyphosis of the thoracic spine with increased AP diameter of the chest with prominent retrosternal airspace in keeping with COPD Heart shadow appears unremarkable  Atherosclerotic vascular calcifications are noted  The lungs are clear  No pneumothorax or pleural effusion  Osseous structures appear within normal limits for patient age  Impression: COPD  Prominent atherosclerotic disease of aorta  No acute findings Workstation performed: [id number]         Epic/outpatient Records Reviewed: Yes     This note has been constructed using a voice recognition system

## 2018-03-14 NOTE — PLAN OF CARE
DISCHARGE PLANNING     Discharge to home or other facility with appropriate resources Progressing        HEMATOLOGIC - ADULT     Maintains hematologic stability Progressing        Knowledge Deficit     Patient/family/caregiver demonstrates understanding of disease process, treatment plan, medications, and discharge instructions Progressing        Potential for Falls     Patient will remain free of falls Progressing

## 2018-03-14 NOTE — CASE MANAGEMENT
Initial Clinical Review    Admission: Date/Time/Statement: 3/14/18 @ 0914     Orders Placed This Encounter   Procedures    Inpatient Admission (expected length of stay for this patient is greater than two midnights)     Standing Status:   Standing     Number of Occurrences:   1     Order Specific Question:   Admitting Physician     Answer:   Heraclio Bryant     Order Specific Question:   Level of Care     Answer:   Med Surg [16]     Order Specific Question:   Estimated length of stay     Answer:   More than 2 Midnights     Order Specific Question:   Certification     Answer:   I certify that inpatient services are medically necessary for this patient for a duration of greater than two midnights  See H&P and MD Progress Notes for additional information about the patient's course of treatment  ED: Date/Time/Mode of Arrival:   ED Arrival Information     Expected Arrival Acuity Means of Arrival Escorted By Service Admission Type    - 3/14/2018 06:43 Emergent [facility]    Arrival Complaint    coumadin level high          Chief Complaint:   Chief Complaint   Patient presents with    Abnormal Lab     pt presents for evaluation of INR, reports PCP reports INR of 12  pt c/o dry mouth, son reports flu like symptoms for 1 week  History of Illness: 77-year-old female with a history of asthma, DVT on Coumadin presents to the emergency department with elevated INR  Patient had routine blood work done yesterday and was called this morning and told to come to the emergency department because her INR was 12  Patient has noticed some blood in her mouth but no other bleeding or bruising  No dark stools  She has never had a history of elevated INR  She had blood work done in January which showed she had a low INR and at that time her Coumadin was increased on Thursday, Saturday and Tuesdays to 3 milligrams up from 2 milligrams    On Monday, Wednesday and Friday she takes 2 milligrams  She states she did not get her INR checked last month because she has been battling flu like symptoms  She states for the last month she eats very little  She denies vomiting or diarrhea  No fevers or chills       Rectal exam shows guaiac positive stool (dark heme positive)  ED Vital Signs:   ED Triage Vitals   Temperature Pulse Respirations Blood Pressure SpO2   03/14/18 1048 03/14/18 0656 03/14/18 0656 03/14/18 0656 03/14/18 0656   97 6 °F (36 4 °C) (!) 110 17 (!) 194/84 95 %      Temp Source Heart Rate Source Patient Position - Orthostatic VS BP Location FiO2 (%)   03/14/18 1048 03/14/18 0656 03/14/18 0656 03/14/18 0656 --   Tympanic Monitor Lying Right arm       Pain Score       03/14/18 0812       No Pain        Wt Readings from Last 1 Encounters:   03/14/18 52 6 kg (115 lb 15 4 oz)       Abnormal Labs/Diagnostic Test Results:  H&H 9 2 / 28 6,   BUN 31,   T pro 6 2,    INR 12 07,   PT 95 3,   PTT  123  CXR:       COPD   Prominent atherosclerotic disease of aorta   No acute findings        EKG: Normal sinus rhythm  Nonspecific ST and T wave abnormality    ED Treatment:   Medication Administration from 03/14/2018 0643 to 03/14/2018 1039       Date/Time Order Dose Route Action Action by Comments     03/14/2018 0812 phytonadione (MEPHYTON) tablet 5 mg 5 mg Oral Given Miller Loredo RN           Past Medical/Surgical History: Active Ambulatory Problems     Diagnosis Date Noted    DVT (deep venous thrombosis) (HCC)     [facility]     Factor V Leiden mutation ([facility] ) 10/31/2012     Resolved Ambulatory Problems     Diagnosis Date Noted    No Resolved Ambulatory Problems     Past Medical History:   Diagnosis Date    Asthma     COPD (chronic obstructive pulmonary disease) ([facility] )     DVT (deep venous thrombosis) ([facility])        Admitting Diagnosis: Abnormal blood chemistry [R79 9]  GI bleed [K92 2]  Elevated INR [R79 1]    Age/Sex: 80 y o  female    Assessment/Plan:   72-year-old female with a known history of factor 5 Leiden  She has been on Coumadin which is checked monthly by her primary care doctor  However in February the patient had bout of the flu and had not checked her INR in the month of February  She went yesterday and was reported back at 12 4  Her primary care doctor called her to go to the ER immediately  She presented to [facility] and repeat INR was 12 0  The patient noticed some dry blood in her mouth and some bruising but no overt bleeding  She denied any dark or tarry stools      In the ER the patient's hemoglobin was 9 2  The rest of her labs except for the INR was her within acceptable limits  Her initial blood pressure was 331 systolic it has since come down  Chest x-ray showed no active pulmonary disease  And she was noted to be heme-positive with dark brown stool     [facility] Problem List:      Principal Problem:    Coagulopathy ([facility] )  Active Problems:    Factor V Leiden ([facility] )    Asthma    GI bleed     Plan for the Primary Problem(s):  1  Coagulopathy - with an INR of 12 0  Fortunately the patient is not having any gross bleeding events  However she did have dark stools on the rectal exam that were heme-positive  The patient denies any abdominal pain  In the ER the patient was given vitamin K 5 mg p o  and ordered 2 units of fresh frozen plasma  We will repeat an INR in 6 hours, and if still greater than 5 we will consider another 2 units of fresh frozen plasma  · Consent for blood products was obtained by the ER provider Dr Margaux Villatoro for Additional Problems:   2  Possible GI bleed - noted to be heme-positive with dark brown stool in the ER  Hemoglobin is 9 2, in October 2017 is 13 5  The patient's abdomen is benign  The patient has a remote history of a colonoscopy without any findings    The patient declines a GI consult at this time and she would not be interested in a colonoscopy or an EGD given her advanced age  PPI po b i d   3   Asthma-no exacerbation continue Ventolin and Serevent  4  Essential [id number] systolic present on admission, repeat 118/74 without intervention  Continue lisinopril  Add hydralazine p r n   5   Anxiety-Ativan p r n   6   Osteoarthritis-tramadol & APAP  Patient denies NSAID use        VTE Prophylaxis: Warfarin (Coumadin) supratherapeutic  Code Status:  Full code     Anticipated Length of Stay:  Patient will be admitted on an Inpatient basis with an anticipated length of stay of  Greater than 2 midnights given the Coumadin coagulopathy and heme-positive suspicious for GI bleed       Admission Orders:  IP    Transfuse 2 units FFP  PT / OT Eval      Scheduled Meds:   Current Facility-Administered Medications:  acetaminophen 650 mg Oral Q6H PRN Hartley Senick, PA-C   albuterol 2 puff Inhalation Q6H PRN Lorraine Senick, PA-C   atorvastatin 20 mg Oral Daily With [employer], PA-C   calcium carbonate 1,000 mg Oral Daily PRN Lorraine Senick, PA-C   fluticasone 2 puff Inhalation BID Lorraine Senick, PA-C   hydrALAZINE 10 mg Intravenous Q6H PRN Hartley Senick, PA-C   lisinopril 10 mg Oral Daily Hartley Senick, PA-C   LORazepam 1 mg Oral Q12H PRN Hartley Senick, PA-C   ondansetron 4 mg Intravenous Q6H PRN Hartley Senick, PA-C   pantoprazole 40 mg Oral Early Morning Hartley Senick, PA-C   salmeterol 1 puff Inhalation Q12H [address] Hartley Senick, PA-C   traMADol 50 mg Oral Daily PRN Lorraine Senick, PA-C     Continuous Infusions:    PRN Meds:   acetaminophen    albuterol    calcium carbonate    hydrALAZINE    LORazepam    ondansetron    traMADol      Thank you,  [facility] in the [employer] by Pelon Vaughan for 2017  Network Utilization Review Department  Phone: [phone]; Fax [phone]  ATTENTION: The Network Utilization Review Department is now centralized for our 7 Facilities   Make a note that we have a new phone and fax numbers for our Department  Please call with any questions or concerns to [phone] and carefully follow the prompts so that you are directed to the right person  All voicemails are confidential  Fax any determinations, approvals, denials, and requests for initial or continue stay review clinical to [phone]  Due to HIGH CALL volume, it would be easier if you could please send faxed requests to expedite your requests and in part, help us provide discharge notifications faster

## 2018-03-15 ENCOUNTER — TELEPHONE (OUTPATIENT)
Dept: FAMILY MEDICINE CLINIC | Facility: CLINIC | Age: 83
End: 2018-03-15

## 2018-03-15 VITALS
WEIGHT: 115.96 LBS | HEART RATE: 87 BPM | SYSTOLIC BLOOD PRESSURE: 116 MMHG | OXYGEN SATURATION: 98 % | BODY MASS INDEX: 20.54 KG/M2 | RESPIRATION RATE: 17 BRPM | TEMPERATURE: 98.5 F | DIASTOLIC BLOOD PRESSURE: 56 MMHG

## 2018-03-15 PROBLEM — K92.2 GI BLEED: Status: RESOLVED | Noted: 2018-03-14 | Resolved: 2018-03-15

## 2018-03-15 PROBLEM — D68.9 COAGULOPATHY (HCC): Status: RESOLVED | Noted: 2018-03-14 | Resolved: 2018-03-15

## 2018-03-15 LAB
ABO GROUP BLD BPU: NORMAL
ABO GROUP BLD BPU: NORMAL
ANION GAP SERPL CALCULATED.3IONS-SCNC: 9 MMOL/L (ref 4–13)
BPU ID: NORMAL
BPU ID: NORMAL
BUN SERPL-MCNC: 27 MG/DL (ref 5–25)
CALCIUM SERPL-MCNC: 8.8 MG/DL (ref 8.3–10.1)
CHLORIDE SERPL-SCNC: 107 MMOL/L (ref 100–108)
CO2 SERPL-SCNC: 25 MMOL/L (ref 21–32)
CREAT SERPL-MCNC: 0.78 MG/DL (ref 0.6–1.3)
ERYTHROCYTE [DISTWIDTH] IN BLOOD BY AUTOMATED COUNT: 16.4 % (ref 11.6–15.1)
GFR SERPL CREATININE-BSD FRML MDRD: 66 ML/MIN/1.73SQ M
GLUCOSE SERPL-MCNC: 103 MG/DL (ref 65–140)
HCT VFR BLD AUTO: 25.5 % (ref 34.8–46.1)
HGB BLD-MCNC: 8.2 G/DL (ref 11.5–15.4)
INR PPP: 2.56 (ref 0.86–1.16)
MCH RBC QN AUTO: 31.7 PG (ref 26.8–34.3)
MCHC RBC AUTO-ENTMCNC: 32.2 G/DL (ref 31.4–37.4)
MCV RBC AUTO: 99 FL (ref 82–98)
PLATELET # BLD AUTO: 213 THOUSANDS/UL (ref 149–390)
PMV BLD AUTO: 9.3 FL (ref 8.9–12.7)
POTASSIUM SERPL-SCNC: 3.8 MMOL/L (ref 3.5–5.3)
PROTHROMBIN TIME: 27.8 SECONDS (ref 12.1–14.4)
RBC # BLD AUTO: 2.59 MILLION/UL (ref 3.81–5.12)
SODIUM SERPL-SCNC: 141 MMOL/L (ref 136–145)
UNIT DISPENSE STATUS: NORMAL
UNIT DISPENSE STATUS: NORMAL
UNIT PRODUCT CODE: NORMAL
UNIT PRODUCT CODE: NORMAL
UNIT RH: NORMAL
UNIT RH: NORMAL
WBC # BLD AUTO: 6.37 THOUSAND/UL (ref 4.31–10.16)

## 2018-03-15 PROCEDURE — 85610 PROTHROMBIN TIME: CPT | Performed by: INTERNAL MEDICINE

## 2018-03-15 PROCEDURE — 97166 OT EVAL MOD COMPLEX 45 MIN: CPT

## 2018-03-15 PROCEDURE — 85027 COMPLETE CBC AUTOMATED: CPT | Performed by: PHYSICIAN ASSISTANT

## 2018-03-15 PROCEDURE — 80048 BASIC METABOLIC PNL TOTAL CA: CPT | Performed by: PHYSICIAN ASSISTANT

## 2018-03-15 PROCEDURE — 99239 HOSP IP/OBS DSCHRG MGMT >30: CPT | Performed by: PHYSICIAN ASSISTANT

## 2018-03-15 PROCEDURE — G8988 SELF CARE GOAL STATUS: HCPCS

## 2018-03-15 PROCEDURE — G8978 MOBILITY CURRENT STATUS: HCPCS

## 2018-03-15 PROCEDURE — G8979 MOBILITY GOAL STATUS: HCPCS

## 2018-03-15 PROCEDURE — 97163 PT EVAL HIGH COMPLEX 45 MIN: CPT

## 2018-03-15 PROCEDURE — G8987 SELF CARE CURRENT STATUS: HCPCS

## 2018-03-15 RX ORDER — PANTOPRAZOLE SODIUM 40 MG/1
40 TABLET, DELAYED RELEASE ORAL
Qty: 30 TABLET | Refills: 0 | Status: SHIPPED | OUTPATIENT
Start: 2018-03-16 | End: 2018-07-03 | Stop reason: ALTCHOICE

## 2018-03-15 RX ORDER — WARFARIN SODIUM 1 MG/1
2 TABLET ORAL
Qty: 72 TABLET | Refills: 0
Start: 2018-03-15 | End: 2018-04-03 | Stop reason: SDUPTHER

## 2018-03-15 RX ORDER — ACETAMINOPHEN 325 MG/1
650 TABLET ORAL EVERY 6 HOURS PRN
Qty: 30 TABLET | Refills: 0 | Status: SHIPPED | OUTPATIENT
Start: 2018-03-15

## 2018-03-15 RX ADMIN — ACETAMINOPHEN 650 MG: 325 TABLET, FILM COATED ORAL at 12:51

## 2018-03-15 RX ADMIN — SALMETEROL XINAFOATE 1 PUFF: 50 POWDER, METERED ORAL; RESPIRATORY (INHALATION) at 08:29

## 2018-03-15 RX ADMIN — LISINOPRIL 10 MG: 10 TABLET ORAL at 08:29

## 2018-03-15 RX ADMIN — FLUTICASONE PROPIONATE 2 PUFF: 110 AEROSOL, METERED RESPIRATORY (INHALATION) at 08:29

## 2018-03-15 RX ADMIN — PANTOPRAZOLE SODIUM 40 MG: 40 TABLET, DELAYED RELEASE ORAL at 05:31

## 2018-03-15 NOTE — TELEPHONE ENCOUNTER
Jerzy DIAZ at [facility]  Internal medicine called this morning and left a message on the reception line  Quique Shepherd would appreciate a call back about pt's INR management and coumadin dosage  Pt has been in the hospital for an INR of 12   Please call Quique Shepherd on her personal cell phone at [phone]

## 2018-03-15 NOTE — DISCHARGE INSTRUCTIONS
Your Coumadin level/INR was too high  Was over 12 0 on admission  Your given oral vitamin K and 2 units of fresh frozen plasma  Your INR at discharge is 2 5  Discussed with your primary care doctor, Dr Pramod Newton  She recommends that you take 2 mg of Coumadin daily and have your INR checked on Monday 03/19/2018  Dr Pramod Newton office will call you with the results and recommendations of any medication changes    Please make an appointment to see Dr Pramod Newton within 1 week    To if you notice any dark tarry stools or gross blood in her stools go to the ER immediately

## 2018-03-15 NOTE — NURSING NOTE
IV removed; AVS reviewed with patient and son  Scripts given to son  Patient left with son; home no needs

## 2018-03-15 NOTE — PLAN OF CARE
Problem: OCCUPATIONAL THERAPY ADULT  Goal: Performs self-care activities at highest level of function for planned discharge setting  See evaluation for individualized goals  Treatment Interventions: ADL retraining, Functional transfer training, UE strengthening/ROM, Endurance training, Cognitive reorientation, Patient/family training, Equipment evaluation/education, Compensatory technique education, Energy conservation, Activityengagement          See flowsheet documentation for full assessment, interventions and recommendations  Limitation: Decreased ADL status, Decreased UE strength, Decreased Safe judgement during ADL, Decreased cognition, Decreased endurance, Decreased self-care trans, Decreased high-level ADLs  Prognosis: Good  Assessment: Pt is a 80 y o  female seen for OT evaluation s/p admit to [address] [place] [address] on 3/14/2018 w/ Coagulopathy ([address] ), elevated INR  Comorbidities affecting pt's functional performance at time of assessment include: factor V Leiden, asthma, GI bleed, COPD  Personal factors affecting pt at time of IE include:impaired STM  Prior to admission, pt was living w/ daughter who provides 24/7 supervision/support and reports independent w/ ADLs, independent w/ functional transfers and mobility w/ RW or SPC, daughter completes IADLS  Upon evaluation: Pt requires MIN assist supine<>sit bed mobility, MIN assist sit<>stand functional transfers, MIN assist functional mobility w/ RW, MIN assist LB ADLs 2* the following deficits impacting occupational performance: decreased strength and endurance, impaired balance, impaired activity tolerance, increased fatigue, impaired cognition (STM, insight, safety awareness)  Pt to benefit from continued skilled OT tx while in the hospital to address deficits as defined above and maximize level of functional independence w ADL's and functional mobility   Occupational Performance areas to address include: grooming, bathing/shower, toilet hygiene, dressing, functional mobility and clothing management, formal cognitive assessment  Pt educated on completing tasks seated and taking rest breaks  From OT standpoint, recommendation at time of d/c would be home OT w/ family support, pt declining HOME OT       OT Discharge Recommendation: Home OT  OT - OK to Discharge:  (when medically stable)      Comments: Jeanne Degroot MS, OTR/L

## 2018-03-15 NOTE — TELEPHONE ENCOUNTER
I spoke to JOEL  Patient is being discharged on 2 milligrams Coumadin daily  She is to have an INR on Monday  And then we will call her with results

## 2018-03-15 NOTE — OCCUPATIONAL THERAPY NOTE
[address]  Evaluation     Patient Name: Kyra Villalta  YKYHG'Q Date: 3/15/2018  Problem List  Patient Active Problem List   Diagnosis    [facility]    [facility]) [facility]    Factor [facility] Leiden mutation ([facility] )    Factor V Leiden ([facility] )    Asthma    GI bleed    Coagulopathy ([facility] )     Past Medical History  Past Medical History:   Diagnosis Date    Asthma     COPD (chronic obstructive pulmonary disease) ([facility] )     DVT (deep venous thrombosis) ([facility] )      Past Surgical History  History reviewed  No pertinent surgical history  03/15/18 09   Note Type   Note type Eval/Treat   Restrictions/Precautions   Weight Bearing Precautions Per Order No   Other Precautions Bed Alarm;Cognitive; Fall Risk   Pain Assessment   Pain Assessment No/denies pain   Pain Score No Pain   Home Living   Type of [address] [place] [address] One level;Stairs to enter with rails  (2 MER[address])   Bathroom Shower/Tub Walk-in shower   Bathroom Toilet Standard   Bathroom Equipment Grab bars in shower; Shower chair   Bathroom Accessibility Accessible   Home Equipment Walker;Cane  (reports mainly used cane PTA)   Additional Comments daughter provides 24/7 supervision   Prior Function   Level of [place] Independent with ADLs and functional mobility   Lives With Daughter   Receives Help From Family   ADL Assistance Independent   IADLs Needs assistance  (daughter completes)   Falls in the last 6 months 1 to 4   Vocational Retired   Comments pt daughter drives   Lifestyle   Autonomy per pt independent w/ ADLs, independent w/ functional transfers and mobility w/ SPC or RW, neldaaghter completes IADLs   Reciprocal Relationships daughter   Service to Others retired Physical Therapist   Intrinsic Gratification shopping at [employer] 5  [facility] 5  [address] [facility] [address]  [address] [place] [address] Bathing Assistance 4  Minimal Assistance   UB Dressing Assistance 5  Supervision/Setup   LB Dressing Assistance 4  Minimal Assistance   Toileting Assistance  4  Minimal Assistance   Bed Mobility   Supine to Sit 4  Minimal assistance   Additional items Assist x 1;HOB elevated; Bedrails; Increased time required;Verbal cues   Sit to Supine 4  Minimal assistance   Additional items Assist x 1; Increased time required;Verbal cues;LE management; Bedrails   Additional Comments increased time to complete   Transfers   Sit to Stand 4  Minimal assistance   Additional items Assist x 1; Increased time required;Verbal cues   Stand to Sit 4  Minimal assistance   Additional items Assist x 1; Increased time required;Verbal cues   Additional Comments VCs for positioning and safety   Functional Mobility   Functional Mobility 4  Minimal assistance   Additional Comments assist x 1 w/ cues for safety   Additional items Rolling walker   Balance   Static Sitting Good   Dynamic Sitting Fair +   Static Standing Fair   Dynamic Standing Fair -   Ambulatory Fair -   Activity Tolerance   Activity Tolerance Patient limited by fatigue   Nurse Made Aware appropriate to see per Adrianna KATHLEEN Assessment   RUE Assessment WFL   LUE Assessment   LUE Assessment WFL   Hand Function   Gross Motor Coordination Functional   Fine Motor Coordination Functional   Sensation   Light Touch No apparent deficits   Sharp/Dull No apparent deficits   Proprioception   Proprioception No apparent deficits   Vision-Basic Assessment   Current Vision Wears glasses only for reading   Vision - Complex Assessment   Ocular Range of Motion [facility]   Acuity Able to read clock/calendar on wall without difficulty   Perception   Inattention/Neglect Appears intact   Cognition   Overall Cognitive Status Impaired   Arousal/Participation Cooperative;Responsive   Attention Attends with cues to redirect   Orientation Level Oriented to person;Oriented to place; Disoriented to situation;Oriented to time  (not specific date, increased time for year)   Memory Decreased recall of precautions;Decreased short term memory   Following Commands Follows one step commands without difficulty   Comments pt w/ impaired STM, insight and safety awareness   Assessment   Limitation Decreased ADL status; Decreased UE strength;Decreased Safe judgement during ADL;Decreased cognition;Decreased endurance;Decreased self-care trans;Decreased high-level ADLs   Prognosis Good   Assessment Pt is a 80 y o  female seen for OT evaluation s/p admit to [facility] on 3/14/2018 w/ Coagulopathy ([facility] ), elevated INR  Comorbidities affecting pt's functional performance at time of assessment include: factor V Leiden, asthma, GI bleed, COPD  Personal factors affecting pt at time of IE include:impaired STM  Prior to admission, pt was living w/ daughter who provides 24/7 supervision/support and reports independent w/ ADLs, independent w/ functional transfers and mobility w/ RW or SPC, daughter completes IADLS  Upon evaluation: Pt requires MIN assist supine<>sit bed mobility, MIN assist sit<>stand functional transfers, MIN assist functional mobility w/ RW, MIN assist LB ADLs 2* the following deficits impacting occupational performance: decreased strength and endurance, impaired balance, impaired activity tolerance, increased fatigue, impaired cognition (STM, insight, safety awareness)  Pt to benefit from continued skilled OT tx while in the hospital to address deficits as defined above and maximize level of functional independence w ADL's and functional mobility  Occupational Performance areas to address include: grooming, bathing/shower, toilet hygiene, dressing, functional mobility and clothing management, formal cognitive assessment  Pt educated on completing tasks seated and taking rest breaks  From OT standpoint, recommendation at time of d/c would be home OT w/ family support, pt declining HOME OT     Goals   Patient Goals "to go home" LTG Time Frame 7-10   Long Term Goal please see below goals   Plan   Treatment Interventions ADL retraining;Functional transfer training;UE strengthening/ROM; Endurance training;Cognitive reorientation;Patient/family training;Equipment evaluation/education; Compensatory technique education; Energy conservation; Activityengagement   Goal Expiration Date 03/25/18   OT Frequency 3-5x/wk   Recommendation   OT Discharge Recommendation Home OT   OT - OK to Discharge (when medically stable)   Barthel Index   Feeding 10   Bathing 5   Grooming Score 5   Dressing Score 5   Bladder Score 10   Bowels Score 10   Toilet Use Score 5   Transfers (Bed/Chair) Score 10   Mobility (Level Surface) Score 0   Stairs Score 0   Barthel Index Score 60   Modified [place] Scale   Modified [place] Scale 4     Occupational Therapy Goals to be met in 7-10 days:  1) Pt will improve activity tolerance to G for min 30 min txment sessions  2) Pt will complete ADLs/self care w/ mod I   3) Pt will complete toileting w/ mod I w/ G hygiene/thoroughness using DME PRN  4) Pt will improve functional transfers on/off all surfaces using DME PRN w/ G balance/safety including toileting w/ mod I  5) Pt will improve fx'l mobility during I/ADl/leisure tasks using DME PRN w/ g balance/safety w/ mod I  6) Pt will engage in ongoing cognitive assessment w/ G participation to A w/ safe d/c planning/recommendations  7) Pt will demonstrate G carryover of pt/caregiver education and training as appropriate w/ mod I  w/ G tolerance  8) Pt will engage in depression screen/leisure interest checklist w/ G participation to monitor s/s depression and ID 3 positive coping strategies to A w/ emotional regulation and management  9) Pt will demonstrate 100% carryover of E C  techniques w/ mod I t/o fx'l I/ADL/leisure tasks w/o cues s/p skilled education  10) Pt will demonstrate improved bed mobility to MOD I  11) Pt will demonstrate improved standing tolerance to 3-5 minutes during functional tasks w/ no LOB to enhance ADL performance  12) Pt will demonstrate improved b/l UE strength by 1 MMT grade to enhance ADLS and functional transfers     Documentation completed by: Erick Hameed MS, OTR/L

## 2018-03-15 NOTE — PHYSICAL THERAPY NOTE
PHYSICAL THERAPY EVALUATION  NAME:  Chantal Gladstone  DATE: 03/15/18    AGE:   80 y o  Mrn:   [id number]  ADMIT DX:  Abnormal blood chemistry [R79 9]  GI bleed [K92 2]  Elevated INR [R79 1]    Past Medical History:   Diagnosis Date    Asthma     COPD (chronic obstructive pulmonary disease) ([facility] )     DVT (deep venous thrombosis) ([facility] )        History reviewed  No pertinent surgical history  Length Of Stay: 1    PHYSICAL THERAPY EVALUATION:      03/15/18 0947   Note Type   Note type Eval only   Pain Assessment   Pain Assessment No/denies pain   Home Living   Type of [address] One level;Stairs to enter with rails  (2 MERLE )   Home Equipment Walker;Cane  ([facility] for ambulation PTA)   Additional Comments Pt reports living with her daughter who is home during the day to assist her  Pt also states that her son lives close by    Prior Function   Level of [place] Independent with ADLs and functional mobility   Lives With Daughter   Receives Help From Family;Friend(s)   ADL Assistance Independent   Falls in the last 6 months 1 to 4   Vocational Retired   Comments Pt reports the use of a SPC for ambulation with the occasional use of RW    Restrictions/Precautions   Weight Bearing Precautions Per Order No   Other Precautions Fall Risk;Multiple lines;Cognitive; Chair Alarm; Bed Alarm  (bed alarm on post session )   General   Family/Caregiver Present No   Cognition   Overall Cognitive Status Impaired   Arousal/Participation Alert   Orientation Level Oriented to person;Oriented to place   Memory Decreased recall of recent events   Following Commands Follows one step commands without difficulty   RUE Assessment   RUE Assessment WFL   LUE Assessment   LUE Assessment WFL   RLE Assessment   RLE Assessment WFL   Strength RLE   RLE Overall Strength 4/5   LLE Assessment   LLE Assessment WFL   Strength LLE   LLE Overall Strength 4/5   Bed Mobility   Supine to Sit 4  Minimal assistance   Additional items Assist x 1; Increased time required;Verbal cues   Sit to Supine 4  Minimal assistance   Additional items Assist x 1; Increased time required;Verbal cues   Transfers   Sit to Stand 4  Minimal assistance   Additional items Assist x 1; Increased time required;Verbal cues   Stand to Sit 4  Minimal assistance   Additional items Assist x 1; Increased time required;Verbal cues   Additional Comments VC needed for hand placement and safety with all transfers    Ambulation/Elevation   Gait pattern Excessively slow; Short stride; Foward flexed   Gait Assistance 5  Supervision   Additional items Assist x 1   Assistive Device Rolling walker   Distance 35ft x 2   (limited by fatigue )   Balance   Static Sitting Good   Static Standing Fair   Ambulatory Fair -   Endurance Deficit   Endurance Deficit Yes   Endurance Deficit Description fatigue    Activity Tolerance   Activity Tolerance Patient limited by fatigue   Nurse Made Aware pt able to be seen per Celia Little RN   Assessment   Prognosis Good   Problem List Decreased strength;Decreased endurance; Impaired balance;Decreased mobility; Decreased safety awareness;Decreased cognition;Decreased skin integrity   Assessment Pt is 80 y o  female seen for PT evaluation s/p admit to [address] [address] on 3/14/2018  Two pt identifiers were used to confirm  Pt presented at her PCP with abnormal lab values  Pts INR was noted to be 12 4  Pt was admitted with a primary dx of: coagulopathy, heme occult positive stool  PT now consulted for assessment of mobility and d/c needs  Pts current co morbidities effecting treatment include: Asthma, COPD, hx of DVT, and personal factors including MERLE home   Pts current clinical presentation is Unstable/ Unpredictable (high complexity) due to Ongoing medical management for primary dx, Increased reliance on more restrictive AD compared to baseline, Decreased activity tolerance compared to baseline, Fall risk, Cog status, Trending lab values     Prior to admission, pt was I with ambulation with the use of a SPC and occasional use of RW  Upon evaluation, pt currently is requiring min A for bed mobility; min A for transfers and S for ambulation w/ RW   Pt denies any lightheadedness or dizziness with ambulation  Pt presents at PT eval functioning below baseline and currently w/ overall mobility deficits 2* to: BLE weakness, impaired balance, decreased endurance, gait deviations, decreased activity tolerance compared to baseline, decreased safety awareness, impaired judgement, fall risk  Pt currently at a fall risk 2* to impairments listed above  Pt will continue to benefit from skilled PT interventions to address stated impairments; to maximize functional mobility; for ongoing pt/ family training; and DME needs  At conclusion of PT session pt returned BTB and bed alarm engaged with phone and call bell within reach  Pt denies any further questions at this time  PT is currently recommending home PT, however pt is declining at this time  Pt/ family agreeable to plan and goals as stated on evaluation  PT will continue to follow during hospital stay  Barriers to Discharge None   Goals   Patient Goals " to go home"   STG Expiration Date 03/25/18   Short Term Goal #1 In 10 days pt will complete: 1) Bed mobility skills with mod I  2) Functional transfers with mod I  3) Ambulate 150' using least restrictive AD with S without LOB and stable vitals  4) Stair training up/ down 2 step/s using rail/s with S  5) Improve balance grades to Good 6) Improve BLE strength by 1/2 grade  7) PT for ongoing pt and family education; DME needs and D/C planning to promote highest level of function in least restrictive environment  Plan   Treatment/Interventions Functional transfer training;LE strengthening/ROM; Elevations; Therapeutic exercise; Endurance training;Patient/family training;Equipment eval/education; Bed mobility;Gait training;Spoke to nursing;OT   PT Frequency 5x/wk   Recommendation Recommendation Home PT  (Pt declining home PT at this time )   Equipment Recommended Walker  (RW)   PT - OK to Discharge Yes  (when medically cleared )   Modified [place] Scale   Modified Verdigre Scale 4   Barthel Index   Feeding 10   Bathing 5   Grooming Score 5   Dressing Score 5   Bladder Score 10   Bowels Score 10   Toilet Use Score 5   Transfers (Bed/Chair) Score 10   Mobility (Level Surface) Score 0   Stairs Score 0   Barthel Index Score 60   Damon Alvarado, PT

## 2018-03-15 NOTE — PLAN OF CARE
Problem: PHYSICAL THERAPY ADULT  Goal: Performs mobility at highest level of function for planned discharge setting  See evaluation for individualized goals  Treatment/Interventions: Functional transfer training, LE strengthening/ROM, Elevations, Therapeutic exercise, Endurance training, Patient/family training, Equipment eval/education, Bed mobility, Gait training, Spoke to nursing, OT  Equipment Recommended: Jaylyn Lee (BIGG)       See flowsheet documentation for full assessment, interventions and recommendations  Prognosis: Good  Problem List: Decreased strength, Decreased endurance, Impaired balance, Decreased mobility, Decreased safety awareness, Decreased cognition, Decreased skin integrity  Assessment: Pt is 80 y o  female seen for PT evaluation s/p admit to [facility] on 3/14/2018  Two pt identifiers were used to confirm  Pt presented at her PCP with abnormal lab values  Pts INR was noted to be 12 4  Pt was admitted with a primary dx of: coagulopathy, heme occult positive stool  PT now consulted for assessment of mobility and d/c needs  Pts current co morbidities effecting treatment include: Asthma, COPD, hx of DVT, and personal factors including MERLE home   Pts current clinical presentation is Unstable/ Unpredictable (high complexity) due to Ongoing medical management for primary dx, Increased reliance on more restrictive AD compared to baseline, Decreased activity tolerance compared to baseline, Fall risk, Cog status, Trending lab values    Prior to admission, pt was I with ambulation with the use of a SPC and occasional use of RW  Upon evaluation, pt currently is requiring min A for bed mobility; min A for transfers and S for ambulation w/ RW   Pt denies any lightheadedness or dizziness with ambulation   Pt presents at PT eval functioning below baseline and currently w/ overall mobility deficits 2* to: BLE weakness, impaired balance, decreased endurance, gait deviations, decreased activity tolerance compared to baseline, decreased safety awareness, impaired judgement, fall risk  Pt currently at a fall risk 2* to impairments listed above  Pt will continue to benefit from skilled PT interventions to address stated impairments; to maximize functional mobility; for ongoing pt/ family training; and DME needs  At conclusion of PT session pt returned BTB and bed alarm engaged with phone and call bell within reach  Pt denies any further questions at this time  PT is currently recommending home PT, however pt is declining at this time  Pt/ family agreeable to plan and goals as stated on evaluation  PT will continue to follow during hospital stay  Barriers to Discharge: None     Recommendation: Home PT (Pt declining home PT at this time )     PT - OK to Discharge: Yes (when medically cleared )    See flowsheet documentation for full assessment

## 2018-03-15 NOTE — DISCHARGE SUMMARY
Discharge Summary -  Internal Medicine / Hospitalists  Smiley Caal 80 y o  female MRN: [id number]    [id number] [address] MED SURG Room / Bed: [place] [address] /E4 -* Encounter: [id number]      Admitting Provider: Steve Uribe DO  Discharge Provider: Marlene Langley PA-C  Admission Date: 3/14/2018     Discharge Date: 3/15/18  Primary Care Physician at Discharge: Mallory Holder [phone]    Primary Discharge Diagnosis  Principal Problem (Resolved):    Coagulopathy ([facility] )  Active Problems:    Factor V Leiden ([facility] )    Asthma  Resolved Problems:    GI bleed      Other Problems Addressed  Patient Active Problem List    Diagnosis Date Noted    [facility]iden ([facility] ) 03/14/2018    Asthma 03/14/2018    DVT (deep venous thrombosis) ([facility] )     COPD (chronic obstructive pulmonary disease) ([facility] )     Factor V Leiden mutation ([facility] ) 10/31/2012       Consulting Providers   Dr Alonzo Lopez - PCP    Diagnostic Procedures Performed  CXR - COPD   Prominent atherosclerotic disease of aorta   No acute findings    Treatments   Vitamin K 5 mg po and 2 units of FFP    Procedures   None    Other Pertinent Test Results    Results from last 7 days  Lab Units 03/15/18  0524   WBC Thousand/uL 6 37   HEMOGLOBIN g/dL 8 2*   HEMATOCRIT % 25 5*   PLATELETS Thousands/uL 213   INR  2 56*       Results from last 7 days  Lab Units 03/15/18  0524 03/14/18  0716   SODIUM mmol/L 141 140   POTASSIUM mmol/L 3 8 3 5   CHLORIDE mmol/L 107 104   CO2 mmol/L 25 25   BUN mg/dL 27* 31*   CREATININE mg/dL 0 78 1 06   CALCIUM mg/dL 8 8 8 4   TOTAL PROTEIN g/dL  --  6 2*   BILIRUBIN TOTAL mg/dL  --  0 82   ALK PHOS U/L  --  86   ALT U/L  --  14   AST U/L  --  24   GLUCOSE RANDOM mg/dL 103 104       Results from last 7 days  Lab Units 03/14/18  0716   TROPONIN I ng/mL <0 02     No results found for: Teja Meres, SPUTUMCULTUR      Presenting Problem/History of Present Illness  Coagulopathy ([facility] )    HOSPITAL COURSE:  1   Coagulopathy - 12 0 POA   s/p vitamin K 5 mg p o  and 2u FFP, INR 2 56  Discussed with the primary care provider Dr Pramod Newton via phone  The patient will be discharged on Coumadin 2 mg daily  An INR check on Monday 03/19/2018 with results going to Dr Pramod Newton who will manage adjustments from there  Dr Pramod Newton request that the patient follow up with her within 1 week    2   Possible GI bleed - noted to be heme-positive with dark brown stool in the ER   Hgb 8 2 vs 9 2 POA vs October 2017 was 13 5   The patient's abdomen is benign   The patient has a remote history of a colonoscopy without any findings  The patient declines a GI consult at this time and she would not be interested in a colonoscopy or an EGD given her advanced age   PPI po daily    3   Asthma-no exacerbation continue Ventolin and Serevent    4   Essential hypertension-stable on lisinopril   hydralazine p r n     5   Anxiety-Ativan p r n     6   Osteoarthritis-tramadol & APAP   Patient denies NSAID use    7  IV site infiltration R arm - improved  No signs of infection    8  Asthenia - patient was evaluated by Physical therapy and Occupational therapy and felt that the patient would benefit from home PT OT    However patient and family declined at this time      PHYSICAL EXAM:  Vitals:   Temp:  [98 5 °F (36 9 °C)-99 °F (37 2 °C)] 98 5 °F (36 9 °C)  HR:  [82-98] 87  Resp:  [16-18] 17  BP: ()/(51-66) 116/56    General Appearance:    Alert, cooperative, no distress   Head:    Normocephalic, without obvious abnormality, atraumatic   Eyes:    Conjunctiva/corneas clear, EOM's intact      Neck:   Supple, no adenopathy, no JVD   Back:     Symmetric, no curvature, ROM normal, no CVA tenderness   Lungs:     Clear to auscultation bilaterally, no wheezing or rhonchi   Heart:    Regular rate and rhythm, S1 and S2 normal, no murmur, rub   or gallop   Abdomen:     Soft, non-tender, bowel sounds active    Extremities:   Dry skin, poor turgor, good capillary refill, scattered ecchymosis on upper extremity brawny lower extremities without edema; posterior right knee large ecchymosis nontender  No rashes or lesions no open wounds   Psych:   Normal Affect   Neurologic:   CNII-XII intact  Week deconditioned       Discharge Disposition: Home      Test Results Pending at Discharge  none    Allergies  Allergies   Allergen Reactions    Cat Hair Extract     Ciprofloxacin Hives    Dog Epithelium     Erythromycin Nausea Only    Sulfa Antibiotics Edema       Diet restrictions: regular  Activity restrictions: as tolerated  Discharge Condition: good      Outpatient Follow-Up  Finesse Roy DO  PCP - General Family Medicine [phone] [address] [place] [address]  [phone] [facility]  [address] [address]     Next Steps: Follow up in 1 week(s)      Instructions: please have INR checked on Mon 3/19/18, and please make an appointment to see Dr Pramod Newton in 1 week            Code Status: Level 1 - Full Code  Advance Directive and Living Will: <no information>  Power of :    POLST:      Discharge Statement   I spent 33 minutes discharging the patient  This time was spent on the day of discharge  Greater than 50% of total time was spent with the patient and / or family counseling and / or coordination of care  Discharge Medications:  See after visit summary for reconciled discharge medications provided to patient and family        This note has been constructed using a voice recognition system

## 2018-03-16 DIAGNOSIS — I10 ESSENTIAL HYPERTENSION: Primary | ICD-10-CM

## 2018-03-17 RX ORDER — LISINOPRIL 10 MG/1
TABLET ORAL
Qty: 30 TABLET | Refills: 3 | Status: SHIPPED | OUTPATIENT
Start: 2018-03-17 | End: 2018-06-24 | Stop reason: SDUPTHER

## 2018-03-19 DIAGNOSIS — F41.9 ANXIETY: ICD-10-CM

## 2018-03-20 DIAGNOSIS — G89.29 OTHER CHRONIC PAIN: ICD-10-CM

## 2018-03-20 RX ORDER — LORAZEPAM 1 MG/1
TABLET ORAL
Qty: 60 TABLET | Refills: 0 | OUTPATIENT
Start: 2018-03-20 | End: 2018-04-17 | Stop reason: SDUPTHER

## 2018-03-21 RX ORDER — TRAMADOL HYDROCHLORIDE 50 MG/1
TABLET ORAL
Qty: 30 TABLET | Refills: 3 | OUTPATIENT
Start: 2018-03-21 | End: 2018-09-18 | Stop reason: SDUPTHER

## 2018-03-22 ENCOUNTER — TELEPHONE (OUTPATIENT)
Dept: FAMILY MEDICINE CLINIC | Facility: CLINIC | Age: 83
End: 2018-03-22

## 2018-04-03 ENCOUNTER — OFFICE VISIT (OUTPATIENT)
Dept: FAMILY MEDICINE CLINIC | Facility: CLINIC | Age: 83
End: 2018-04-03
Payer: MEDICARE

## 2018-04-03 VITALS
BODY MASS INDEX: 21.47 KG/M2 | DIASTOLIC BLOOD PRESSURE: 62 MMHG | WEIGHT: 121.2 LBS | HEART RATE: 112 BPM | TEMPERATURE: 97.6 F | OXYGEN SATURATION: 98 % | SYSTOLIC BLOOD PRESSURE: 124 MMHG

## 2018-04-03 DIAGNOSIS — D68.9 COAGULATION DEFECT (HCC): ICD-10-CM

## 2018-04-03 DIAGNOSIS — D68.51 FACTOR V LEIDEN MUTATION (HCC): ICD-10-CM

## 2018-04-03 DIAGNOSIS — I10 BENIGN ESSENTIAL HYPERTENSION: Primary | ICD-10-CM

## 2018-04-03 DIAGNOSIS — F41.9 ANXIETY: ICD-10-CM

## 2018-04-03 DIAGNOSIS — E78.2 COMBINED HYPERLIPIDEMIA: ICD-10-CM

## 2018-04-03 DIAGNOSIS — J44.9 CHRONIC OBSTRUCTIVE PULMONARY DISEASE, UNSPECIFIED COPD TYPE (HCC): ICD-10-CM

## 2018-04-03 PROCEDURE — 36415 COLL VENOUS BLD VENIPUNCTURE: CPT | Performed by: FAMILY MEDICINE

## 2018-04-03 PROCEDURE — 85025 COMPLETE CBC W/AUTO DIFF WBC: CPT | Performed by: FAMILY MEDICINE

## 2018-04-03 PROCEDURE — 85610 PROTHROMBIN TIME: CPT | Performed by: FAMILY MEDICINE

## 2018-04-03 PROCEDURE — 99214 OFFICE O/P EST MOD 30 MIN: CPT | Performed by: FAMILY MEDICINE

## 2018-04-03 RX ORDER — WARFARIN SODIUM 1 MG/1
TABLET ORAL
Qty: 72 TABLET | Refills: 0
Start: 2018-04-03 | End: 2018-07-03 | Stop reason: SDUPTHER

## 2018-04-03 NOTE — PROGRESS NOTES
Assessment/Plan:    Benign essential hypertension    Patient's blood pressure is stable and we will continue her present medication  Factor V Leiden mutation [facility])    Patient has been on Coumadin for many years without incident  It seems that she may have had the flu and was not drinking or eating well and her INR became very high  She was given fresh frozen plasma and packed red blood cells and her INR went into the range between 2 and 2 5  At this time she is taking 1 mg Coumadin daily on Monday Wednesday Friday and 2 mg all the other days  We are checking an INR today  Also I am checking a CBC to make sure there has been no further blood loss since her discharge from the hospital     I will see her back in three months  She will have INR's monthly  Diagnoses and all orders for this visit:    Benign essential hypertension    Chronic obstructive pulmonary disease, unspecified COPD type ([facility] )    Factor V Leiden mutation ([facility] )  -     CBC and differential  -     Protime-INR    Combined hyperlipidemia    Anxiety    Coagulation defect (HCC)  -     warfarin (JANTOVEN) 1 mg tablet; Take one tab on Mon/Weds/Fri and 2 tabs all other days  Subjective:      Patient ID: Carlos Serna is a 80 y o  female  Patient is here for follow up to hospitalization for INR being too high  She was admitted and was transfused with FFP and rbc's  It is felt that because she had been sick with possible flu, she became dehydrated and her INR went too high  She says she is eating well and she has appeared too have gained weight since being in the hospital   She is using her walker  She lives with her daughter and her son is only a block away  While she was in the hospital, they had found blood in her stool but she did not want a GI workup          The following portions of the patient's history were reviewed and updated as appropriate: allergies, current medications, past family history, past medical history, past social history, past surgical history and problem list     Review of Systems   Constitutional: Negative  HENT: Negative  Respiratory: Negative  Cardiovascular: Negative  Gastrointestinal: Negative  Musculoskeletal: Positive for arthralgias and gait problem  Skin: Positive for color change (Distal lower extremities dry and discolored  )  Psychiatric/Behavioral: Negative  Objective:      /62 (BP Location: Left arm, Patient Position: Sitting)   Pulse (!) 112   Temp 97 6 °F (36 4 °C) (Tympanic)   Wt 55 kg (121 lb 3 2 oz)   SpO2 98%   BMI 21 47 kg/m²          Physical Exam   Constitutional:     Patient looks well nourished and is oriented to person place and time  She is accompanied by her son  Neck: No thyromegaly present  Cardiovascular: Normal rate, regular rhythm and normal heart sounds  Pulmonary/Chest: Effort normal and breath sounds normal    Musculoskeletal: She exhibits no edema  Lymphadenopathy:     She has no cervical adenopathy  Skin: Skin is warm and dry  Both lower extremities, skin very dry and scaly, paper thin  Psychiatric: She has a normal mood and affect  Nursing note and vitals reviewed

## 2018-04-03 NOTE — ASSESSMENT & PLAN NOTE
Patient has been on Coumadin for many years without incident  It seems that she may have had the flu and was not drinking or eating well and her INR became very high  She was given fresh frozen plasma and packed red blood cells and her INR went into the range between 2 and 2 5  At this time she is taking 1 mg Coumadin daily on Monday Wednesday Friday and 2 mg all the other days  We are checking an INR today    Also I am checking a CBC to make sure there has been no further blood loss since her discharge from the hospital

## 2018-04-04 LAB
BASOPHILS # BLD AUTO: 0.01 THOUSANDS/ΜL (ref 0–0.1)
BASOPHILS NFR BLD AUTO: 0 % (ref 0–1)
EOSINOPHIL # BLD AUTO: 0.11 THOUSAND/ΜL (ref 0–0.61)
EOSINOPHIL NFR BLD AUTO: 2 % (ref 0–6)
ERYTHROCYTE [DISTWIDTH] IN BLOOD BY AUTOMATED COUNT: 16.4 % (ref 11.6–15.1)
HCT VFR BLD AUTO: 36.6 % (ref 34.8–46.1)
HGB BLD-MCNC: 11.6 G/DL (ref 11.5–15.4)
INR PPP: 2.16 (ref 0.86–1.16)
LYMPHOCYTES # BLD AUTO: 1.43 THOUSANDS/ΜL (ref 0.6–4.47)
LYMPHOCYTES NFR BLD AUTO: 21 % (ref 14–44)
MCH RBC QN AUTO: 32.3 PG (ref 26.8–34.3)
MCHC RBC AUTO-ENTMCNC: 31.7 G/DL (ref 31.4–37.4)
MCV RBC AUTO: 102 FL (ref 82–98)
MONOCYTES # BLD AUTO: 0.59 THOUSAND/ΜL (ref 0.17–1.22)
MONOCYTES NFR BLD AUTO: 9 % (ref 4–12)
NEUTROPHILS # BLD AUTO: 4.8 THOUSANDS/ΜL (ref 1.85–7.62)
NEUTS SEG NFR BLD AUTO: 68 % (ref 43–75)
NRBC BLD AUTO-RTO: 0 /100 WBCS
PLATELET # BLD AUTO: 250 THOUSANDS/UL (ref 149–390)
PMV BLD AUTO: 10.1 FL (ref 8.9–12.7)
PROTHROMBIN TIME: 24.3 SECONDS (ref 12.1–14.4)
RBC # BLD AUTO: 3.59 MILLION/UL (ref 3.81–5.12)
WBC # BLD AUTO: 6.95 THOUSAND/UL (ref 4.31–10.16)

## 2018-04-05 ENCOUNTER — DOCUMENTATION (OUTPATIENT)
Dept: FAMILY MEDICINE CLINIC | Facility: CLINIC | Age: 83
End: 2018-04-05

## 2018-04-17 DIAGNOSIS — F41.9 ANXIETY: ICD-10-CM

## 2018-04-18 RX ORDER — LORAZEPAM 1 MG/1
TABLET ORAL
Qty: 60 TABLET | Refills: 0 | Status: SHIPPED | OUTPATIENT
Start: 2018-04-18 | End: 2018-05-21 | Stop reason: SDUPTHER

## 2018-04-23 ENCOUNTER — TRANSCRIBE ORDERS (OUTPATIENT)
Dept: FAMILY MEDICINE CLINIC | Facility: CLINIC | Age: 83
End: 2018-04-23

## 2018-04-23 DIAGNOSIS — D68.9 COAGULATION DEFECT (HCC): Primary | ICD-10-CM

## 2018-04-24 LAB — INR PPP: 1.9 (ref 0.86–1.16)

## 2018-04-25 ENCOUNTER — ANTICOAG VISIT (OUTPATIENT)
Dept: FAMILY MEDICINE CLINIC | Facility: CLINIC | Age: 83
End: 2018-04-25

## 2018-04-25 ENCOUNTER — TELEPHONE (OUTPATIENT)
Dept: FAMILY MEDICINE CLINIC | Facility: CLINIC | Age: 83
End: 2018-04-25

## 2018-04-25 DIAGNOSIS — E78.2 MIXED HYPERLIPIDEMIA: Primary | ICD-10-CM

## 2018-04-25 NOTE — TELEPHONE ENCOUNTER
Please call patient  Her INR was 1 9  This is just a little lower than it should be, but I am not going to change her Coumadin dose  Continue 1 tablet on Monday Wednesday Friday and 2 tablets all other day as of a 1 mg pill

## 2018-04-25 NOTE — TELEPHONE ENCOUNTER
Called pt inform her regarding INR results advised her per Dr Rohit Khan to stay on the same dose  1 tab Monday, Wednesday and Friday  2 tab al other days

## 2018-04-26 RX ORDER — ATORVASTATIN CALCIUM 20 MG/1
TABLET, FILM COATED ORAL
Qty: 30 TABLET | Refills: 3 | Status: SHIPPED | OUTPATIENT
Start: 2018-04-26 | End: 2018-08-29 | Stop reason: SDUPTHER

## 2018-05-02 ENCOUNTER — TELEPHONE (OUTPATIENT)
Dept: FAMILY MEDICINE CLINIC | Facility: CLINIC | Age: 83
End: 2018-05-02

## 2018-05-12 DIAGNOSIS — G89.29 OTHER CHRONIC PAIN: ICD-10-CM

## 2018-05-12 RX ORDER — TRAMADOL HYDROCHLORIDE 50 MG/1
TABLET ORAL
Qty: 30 TABLET | Refills: 0 | Status: CANCELLED | OUTPATIENT
Start: 2018-05-12

## 2018-05-17 ENCOUNTER — TELEPHONE (OUTPATIENT)
Dept: FAMILY MEDICINE CLINIC | Facility: CLINIC | Age: 83
End: 2018-05-17

## 2018-05-21 DIAGNOSIS — F41.9 ANXIETY: ICD-10-CM

## 2018-05-21 RX ORDER — LORAZEPAM 1 MG/1
TABLET ORAL
Qty: 60 TABLET | Refills: 0 | Status: SHIPPED | OUTPATIENT
Start: 2018-05-21 | End: 2018-06-22 | Stop reason: SDUPTHER

## 2018-05-24 LAB — INR PPP: 1.4 (ref 0.86–1.17)

## 2018-05-31 ENCOUNTER — ANTICOAG VISIT (OUTPATIENT)
Dept: FAMILY MEDICINE CLINIC | Facility: CLINIC | Age: 83
End: 2018-05-31

## 2018-05-31 ENCOUNTER — TELEPHONE (OUTPATIENT)
Dept: FAMILY MEDICINE CLINIC | Facility: CLINIC | Age: 83
End: 2018-05-31

## 2018-05-31 DIAGNOSIS — D68.51 FACTOR V LEIDEN MUTATION (HCC): Primary | ICD-10-CM

## 2018-05-31 NOTE — TELEPHONE ENCOUNTER
I returned pt's phone call  She had her INR drawn last week the results are in pt's chart under Care everywhere result dated 5/24/2018   Please call pt ASAP

## 2018-05-31 NOTE — TELEPHONE ENCOUNTER
Call patient  Her INR was 1 4  This is low, so I am going to increase her Coumadin to 3 mg every day  According to the chart she is taking 3 mg every day except 2 mg on Monday Wednesday and Friday right now so to increase it we will go to 3 mg daily  Please verify with patient   Recheck in two weeks

## 2018-05-31 NOTE — TELEPHONE ENCOUNTER
Spoke with pt, states she takes 1 mg M,W F and 2 mg every other day  Do you still want pt to take 3 mg daily?   Pt also states she has been having sudden dizziness on/off  for 2 weeks  Pt denies any other Sx's

## 2018-06-18 DIAGNOSIS — J45.909 ASTHMA, UNSPECIFIED ASTHMA SEVERITY, UNSPECIFIED WHETHER COMPLICATED, UNSPECIFIED WHETHER PERSISTENT: Primary | ICD-10-CM

## 2018-06-19 RX ORDER — DEXAMETHASONE 4 MG/1
TABLET ORAL
Qty: 12 G | Refills: 1 | Status: SHIPPED | OUTPATIENT
Start: 2018-06-19 | End: 2018-08-20 | Stop reason: SDUPTHER

## 2018-06-22 DIAGNOSIS — F41.9 ANXIETY: ICD-10-CM

## 2018-06-23 RX ORDER — LORAZEPAM 1 MG/1
TABLET ORAL
Qty: 60 TABLET | Refills: 0 | Status: SHIPPED | OUTPATIENT
Start: 2018-06-23 | End: 2018-07-21 | Stop reason: SDUPTHER

## 2018-06-24 DIAGNOSIS — I10 ESSENTIAL HYPERTENSION: ICD-10-CM

## 2018-06-24 RX ORDER — LISINOPRIL 10 MG/1
TABLET ORAL
Qty: 30 TABLET | Refills: 2 | Status: SHIPPED | OUTPATIENT
Start: 2018-06-24 | End: 2018-07-03 | Stop reason: ALTCHOICE

## 2018-06-29 LAB — INR PPP: 2.3 (ref 0.86–1.17)

## 2018-07-01 ENCOUNTER — ANTICOAG VISIT (OUTPATIENT)
Dept: FAMILY MEDICINE CLINIC | Facility: CLINIC | Age: 83
End: 2018-07-01

## 2018-07-03 ENCOUNTER — OFFICE VISIT (OUTPATIENT)
Dept: FAMILY MEDICINE CLINIC | Facility: CLINIC | Age: 83
End: 2018-07-03
Payer: MEDICARE

## 2018-07-03 VITALS
RESPIRATION RATE: 18 BRPM | OXYGEN SATURATION: 95 % | HEIGHT: 62 IN | BODY MASS INDEX: 20.67 KG/M2 | SYSTOLIC BLOOD PRESSURE: 110 MMHG | HEART RATE: 102 BPM | DIASTOLIC BLOOD PRESSURE: 58 MMHG | WEIGHT: 112.3 LBS | TEMPERATURE: 98.5 F

## 2018-07-03 DIAGNOSIS — F41.9 ANXIETY: ICD-10-CM

## 2018-07-03 DIAGNOSIS — E78.2 COMBINED HYPERLIPIDEMIA: Primary | ICD-10-CM

## 2018-07-03 DIAGNOSIS — D68.51 FACTOR V LEIDEN MUTATION (HCC): ICD-10-CM

## 2018-07-03 DIAGNOSIS — R63.4 WEIGHT LOSS: ICD-10-CM

## 2018-07-03 DIAGNOSIS — M15.9 GENERALIZED OSTEOARTHRITIS: ICD-10-CM

## 2018-07-03 DIAGNOSIS — D68.9 COAGULATION DEFECT (HCC): ICD-10-CM

## 2018-07-03 PROCEDURE — 99214 OFFICE O/P EST MOD 30 MIN: CPT | Performed by: FAMILY MEDICINE

## 2018-07-03 RX ORDER — WARFARIN SODIUM 1 MG/1
TABLET ORAL
Qty: 30 TABLET | Refills: 5 | Status: SHIPPED | OUTPATIENT
Start: 2018-07-03 | End: 2018-08-29 | Stop reason: SDUPTHER
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
## 2018-07-21 DIAGNOSIS — F41.9 ANXIETY: ICD-10-CM

## 2018-07-21 DIAGNOSIS — G89.29 OTHER CHRONIC PAIN: ICD-10-CM

## 2018-07-21 RX ORDER — TRAMADOL HYDROCHLORIDE 50 MG/1
TABLET ORAL
Qty: 30 TABLET | Refills: 0 | Status: CANCELLED | OUTPATIENT
Start: 2018-07-21

## 2018-07-22 RX ORDER — LORAZEPAM 1 MG/1
TABLET ORAL
Qty: 60 TABLET | Refills: 0 | Status: SHIPPED | OUTPATIENT
Start: 2018-07-22 | End: 2018-08-21 | Stop reason: SDUPTHER

## 2018-07-27 ENCOUNTER — TELEPHONE (OUTPATIENT)
Dept: FAMILY MEDICINE CLINIC | Facility: CLINIC | Age: 83
End: 2018-07-27

## 2018-07-27 NOTE — TELEPHONE ENCOUNTER
Pt's son, Julio César Ariza, called (consent ok), inquiring about pt's blood work results  He said he noticed some of the levels were a little higher than previous but didn't hear anything  I informed him we just received the results, and would be reviewing  Told him as soon as Dr Jose Mosquera has a chance to review she will do so and someone will call him with the results   Julio César Ariza can be reached at [phone]

## 2018-07-28 NOTE — TELEPHONE ENCOUNTER
I spoke to Rhona Huynh last evening  Actually patient's blood work is improved  She has no longer any make  Her albumin is a little low, so she could increase her protein  Blood sugar was 100 and he does relate that she eats a lot of sweets  Explained that this is borderline but she may want to watch her sweets intake a little better  She should continue with her monthly INR

## 2018-08-20 DIAGNOSIS — J45.909 ASTHMA, UNSPECIFIED ASTHMA SEVERITY, UNSPECIFIED WHETHER COMPLICATED, UNSPECIFIED WHETHER PERSISTENT: ICD-10-CM

## 2018-08-20 RX ORDER — DEXAMETHASONE 4 MG/1
TABLET ORAL
Qty: 12 G | Refills: 0 | Status: SHIPPED | OUTPATIENT
Start: 2018-08-20

## 2018-08-21 DIAGNOSIS — F41.9 ANXIETY: ICD-10-CM

## 2018-08-22 RX ORDER — LORAZEPAM 1 MG/1
TABLET ORAL
Qty: 60 TABLET | Refills: 0 | Status: SHIPPED | OUTPATIENT
Start: 2018-08-22 | End: 2018-09-28 | Stop reason: SDUPTHER

## 2018-08-29 DIAGNOSIS — E78.2 MIXED HYPERLIPIDEMIA: ICD-10-CM

## 2018-08-29 DIAGNOSIS — G89.29 OTHER CHRONIC PAIN: ICD-10-CM

## 2018-08-29 DIAGNOSIS — D68.9 COAGULATION DEFECT (HCC): ICD-10-CM

## 2018-08-29 RX ORDER — ATORVASTATIN CALCIUM 20 MG/1
20 TABLET, FILM COATED ORAL DAILY
Qty: 90 TABLET | Refills: 1 | Status: SHIPPED | OUTPATIENT
Start: 2018-08-29 | End: 2018-10-17 | Stop reason: SDUPTHER

## 2018-08-29 RX ORDER — TRAMADOL HYDROCHLORIDE 50 MG/1
TABLET ORAL
Qty: 30 TABLET | Refills: 0 | Status: CANCELLED | OUTPATIENT
Start: 2018-08-29

## 2018-08-29 RX ORDER — WARFARIN SODIUM 1 MG/1
TABLET ORAL
Qty: 90 TABLET | Refills: 1 | Status: SHIPPED | OUTPATIENT
Start: 2018-08-29 | End: 2018-10-17 | Stop reason: ALTCHOICE

## 2018-08-29 NOTE — TELEPHONE ENCOUNTER
From: Melia Camacho  Sent: 8/29/2018 2:38 PM EDT  Subject: Medication Renewal Request    Ethan Rodrigues would like a refill of the following medications:     atorvastatin (LIPITOR) 20 mg tablet [employer], DO]     warfarin (JANTOVEN) 1 mg tablet [employer], DO]    Preferred [id number]        Medication renewals requested in this message routed separately:     traMADol (ULTRAM) 50 mg tablet Enzo Ruelas, ]

## 2018-08-29 NOTE — TELEPHONE ENCOUNTER
Call patient  I approved her prescriptions but I noticed she has not been for an INR for 2 months  I would like her to go to the lab and have the blood work done as soon as she can

## 2018-08-30 ENCOUNTER — TELEPHONE (OUTPATIENT)
Dept: FAMILY MEDICINE CLINIC | Facility: CLINIC | Age: 83
End: 2018-08-30

## 2018-08-30 NOTE — TELEPHONE ENCOUNTER
Called pt to let her know Dr Evette Thorne would like her to go for blood work to check her INR as soon as she can  Informed pt the order was placed for a [facility] lab and if she is going somewhere else she needs to call us back to let us know  Pt stated she is currently taking 1mg tablet of warfarin daily

## 2018-09-07 ENCOUNTER — ANTICOAG VISIT (OUTPATIENT)
Dept: FAMILY MEDICINE CLINIC | Facility: CLINIC | Age: 83
End: 2018-09-07

## 2018-09-07 ENCOUNTER — TELEPHONE (OUTPATIENT)
Dept: FAMILY MEDICINE CLINIC | Facility: CLINIC | Age: 83
End: 2018-09-07

## 2018-09-07 LAB — INR PPP: 1.2 (ref 0.86–1.17)

## 2018-09-07 NOTE — TELEPHONE ENCOUNTER
I spoke to patient  Her INR was 1 2  This is too low  I am going to increase her Coumadin to 2 mg on Monday Wednesday and Friday and 1 mg all the other days  I also asked that she go for another INR in 2 weeks  Could you please notify her son of this change in dosage?

## 2018-09-18 DIAGNOSIS — G89.29 OTHER CHRONIC PAIN: ICD-10-CM

## 2018-09-19 RX ORDER — TRAMADOL HYDROCHLORIDE 50 MG/1
TABLET ORAL
Qty: 30 TABLET | Refills: 2 | Status: SHIPPED | OUTPATIENT
Start: 2018-09-19 | End: 2018-11-14 | Stop reason: SDUPTHER

## 2018-09-28 DIAGNOSIS — F41.9 ANXIETY: ICD-10-CM

## 2018-10-01 RX ORDER — LORAZEPAM 1 MG/1
TABLET ORAL
Qty: 60 TABLET | Refills: 0 | Status: SHIPPED | OUTPATIENT
Start: 2018-10-01 | End: 2018-10-27 | Stop reason: SDUPTHER

## 2018-10-15 RX ORDER — LISINOPRIL 10 MG/1
10 TABLET ORAL DAILY
COMMUNITY
Start: 2018-08-03

## 2018-10-17 ENCOUNTER — OFFICE VISIT (OUTPATIENT)
Dept: FAMILY MEDICINE CLINIC | Facility: CLINIC | Age: 83
End: 2018-10-17
Payer: MEDICARE

## 2018-10-17 VITALS
TEMPERATURE: 98.2 F | BODY MASS INDEX: 20.68 KG/M2 | RESPIRATION RATE: 17 BRPM | HEIGHT: 62 IN | DIASTOLIC BLOOD PRESSURE: 70 MMHG | WEIGHT: 112.4 LBS | OXYGEN SATURATION: 95 % | HEART RATE: 108 BPM | SYSTOLIC BLOOD PRESSURE: 122 MMHG

## 2018-10-17 DIAGNOSIS — G89.29 OTHER CHRONIC PAIN: ICD-10-CM

## 2018-10-17 DIAGNOSIS — J44.9 CHRONIC OBSTRUCTIVE PULMONARY DISEASE, UNSPECIFIED COPD TYPE (HCC): ICD-10-CM

## 2018-10-17 DIAGNOSIS — Z79.01 ANTICOAGULANT LONG-TERM USE: ICD-10-CM

## 2018-10-17 DIAGNOSIS — Z23 NEED FOR VACCINATION AGAINST STREPTOCOCCUS PNEUMONIAE: ICD-10-CM

## 2018-10-17 DIAGNOSIS — E78.2 MIXED HYPERLIPIDEMIA: ICD-10-CM

## 2018-10-17 DIAGNOSIS — Z23 NEED FOR SHINGLES VACCINE: ICD-10-CM

## 2018-10-17 DIAGNOSIS — Z00.00 MEDICARE ANNUAL WELLNESS VISIT, SUBSEQUENT: Primary | ICD-10-CM

## 2018-10-17 PROCEDURE — 99214 OFFICE O/P EST MOD 30 MIN: CPT | Performed by: FAMILY MEDICINE

## 2018-10-17 PROCEDURE — G0439 PPPS, SUBSEQ VISIT: HCPCS | Performed by: FAMILY MEDICINE

## 2018-10-17 PROCEDURE — 90670 PCV13 VACCINE IM: CPT | Performed by: FAMILY MEDICINE

## 2018-10-17 PROCEDURE — G0009 ADMIN PNEUMOCOCCAL VACCINE: HCPCS | Performed by: FAMILY MEDICINE

## 2018-10-17 RX ORDER — ATORVASTATIN CALCIUM 20 MG/1
20 TABLET, FILM COATED ORAL DAILY
Qty: 90 TABLET | Refills: 1 | Status: SHIPPED | OUTPATIENT
Start: 2018-10-17 | End: 2018-11-14 | Stop reason: SDUPTHER

## 2018-10-17 RX ORDER — ALBUTEROL SULFATE 90 UG/1
2 AEROSOL, METERED RESPIRATORY (INHALATION)
Qty: 1 INHALER | Refills: 1 | Status: SHIPPED | OUTPATIENT
Start: 2018-10-17 | End: 2018-11-14 | Stop reason: SDUPTHER

## 2018-10-17 NOTE — PROGRESS NOTES
Assessment and Plan:    Patient is seen for subsequent Medicare wellness visit    Problem List Items Addressed This Visit     COPD (chronic obstructive pulmonary disease) ([facility] )    Relevant Medications    albuterol (VENTOLIN HFA) 90 mcg/act inhaler      Other Visit Diagnoses     Need for vaccination against Streptococcus pneumoniae    -  Primary    Relevant Orders    PNEUMOCOCCAL CONJUGATE VACCINE 13-VALENT GREATER THAN 6 MONTHS (Completed)    Mixed hyperlipidemia        Relevant Medications    atorvastatin (LIPITOR) 20 mg tablet    Other Relevant Orders    Lipid Panel with Direct LDL reflex    Comprehensive metabolic panel    CBC and differential    TSH, 3rd generation with Free T4 reflex    Other chronic pain        Need for shingles vaccine        Anticoagulant long-term use        Relevant Medications    apixaban (ELIQUIS) 2 5 mg    Other Relevant Orders    Lipid Panel with Direct LDL reflex    Comprehensive metabolic panel    CBC and differential    TSH, 3rd generation with Free T4 reflex    Medicare annual wellness visit, subsequent            Health Maintenance Due   Topic Date Due    DTaP,Tdap,and Td Vaccines (1 - Tdap) 06/24/1946    INFLUENZA VACCINE  07/01/2018         HPI:  Patient Active Problem List   Diagnosis    COPD (chronic obstructive pulmonary disease) ([place] [address] )    Factor V Leiden mutation ([place] [address] )    Factor V Leiden ([place] [address] )    Asthma    Anxiety    Combined hyperlipidemia    Generalized osteoarthritis     Past Medical History:   Diagnosis Date    Abnormal blood chemistry     last assessed: 09/20/14    Asthma     Breast cancer ([place] [address] ) 08/2008    COPD (chronic obstructive pulmonary disease) ([place] [address] )     [facility]deep venous thrombosis) [facility]     Edema     last assessed: 06/25/15    Gait disturbance     last assessed: 05/14/14    Herpes zoster     last assessed: 11/20/12    Malignant neoplasm of breast (female) [facility])     last assessed: 10/31/12    Open wound of ankle     last assessed: 07/29/15    Pulmonary embolism ([facility] ) 07/2011     Past Surgical History:   Procedure Laterality Date    NO PAST SURGERIES       Family History   Problem Relation Age of Onset    Diabetes Mother     Other Daughter         Agoraphobia    Hypertension Daughter     Cervical cancer Family     Emphysema Family      History   Smoking Status    Former Smoker   Smokeless Tobacco    Never Used     History   Alcohol Use No      History   Drug Use No         Current Outpatient Prescriptions   Medication Sig Dispense Refill    acetaminophen (TYLENOL) 325 mg tablet Take 2 tablets (650 mg total) by mouth every 6 (six) hours as needed for fever 30 tablet 0    albuterol (VENTOLIN HFA) 90 mcg/act inhaler Inhale 2 puffs daily at bedtime 1 Inhaler 1    atorvastatin (LIPITOR) 20 mg tablet Take 1 tablet (20 mg total) by mouth daily 90 tablet 1    FLOVENT  MCG/ACT inhaler INHALE TWO PUFFS BY MOUTH TWICE DAILY 12 g 0    LORazepam (ATIVAN) 1 mg tablet TAKE ONE TABLET BY MOUTH EVERY TWELVE HOURS AS NEEDED  60 tablet 0    traMADol (ULTRAM) 50 mg tablet TAKE ONE TABLET BY MOUTH IN THE EVENING  30 tablet 2    apixaban (ELIQUIS) 2 5 mg Take 1 tablet (2 5 mg total) by mouth 2 (two) times a day 60 tablet 3    diclofenac sodium (VOLTAREN) 1 % Place on the skin      lisinopril (ZESTRIL) 10 mg tablet Take 10 mg by mouth daily      SEREVENT DISKUS 50 MCG/DOSE diskus inhaler 1 puff every 12 (twelve) hours       No current facility-administered medications for this visit        Allergies   Allergen Reactions    Cat Hair Extract     Ciprofloxacin Hives    Dog Epithelium     Erythromycin Nausea Only    Sulfa Antibiotics Edema     Immunization History   Administered Date(s) Administered    H1N1, All Formulations 04/08/2010    Influenza 09/26/2007, 10/02/2013, 09/17/2015, 10/11/2016, 10/18/2017    Influenza Split High Dose Preservative Free IM 09/17/2015, 10/11/2016, 10/18/2017    Influenza TIV (IM) 09/09/2013    Pneumococcal Conjugate 13-Valent 10/17/2018    Pneumococcal Polysaccharide PPV23 01/01/2004       Patient Care Team:  Shereen Espinoza DO as PCP - General  Mariam Fontaine MD    Medicare Screening Tests and Risk Assessments:  July White is here for her Subsequent Wellness visit  Last Medicare Wellness visit information reviewed, patient interviewed and updates made to the record today  Health Risk Assessment:  Patient rates overall health as good  Patient feels that their physical health rating is Slightly worse  Eyesight was rated as Slightly worse  Hearing was rated as Slightly worse  Patient feels that their emotional and mental health rating is Same  Pain experienced by patient in the last 7 days has been None  Patient states that she has experienced no weight loss or gain in last 6 months  Emotional/Mental Health:  Patient has been feeling nervous/anxious  PHQ-9 Depression Screening:    Frequency of the following problems over the past two weeks:      1  Little interest or pleasure in doing things: 0 - not at all      2  Feeling down, depressed, or hopeless: 0 - not at all  PHQ-2 Score: 0          Broken Bones/Falls: Fall Risk Assessment:    In the past year, patient has experienced: History of falling in past year     Number of falls: 1  Patient feels unsteady standing  Patient is not taking medication that can cause feelings of lightheadedness or tiredness  Patient often has no need to rush to the toilet  Bladder/Bowel:  Patient has not leaked urine accidently in the last six months  Patient reports no loss of bowel control  Immunizations:  Patient has had a flu vaccination within the last year  Patient has received a pneumonia shot  Patient has received a shingles shot  Home Safety:  Patient has trouble with stairs inside or outside of their home  Patient currently reports that there are no safety hazards present in home, working smoke alarms, working carbon monoxide detectors        Preventative Screenings:   Breast cancer screening performed, colon cancer screen completed, cholesterol screen completed, no glaucoma eye exam completed    Nutrition:  Current diet: Regular with servings of the following:    Medications:  Patient is currently taking over-the-counter supplements  Patient is able to manage medications  Lifestyle Choices:  Patient reports no tobacco use  Patient has smoked or used tobacco in the past   Patient has stopped her tobacco use  Patient reports no alcohol use  Patient does not drive a vehicle  Patient wears seat belt  Activities of Daily Living:  Can get out of bed by his or her self, able to dress self, able to make own meals, able to do own shopping, able to bathe self, can do own laundry/housekeeping, can manage own money, pay bills and track expenses    Advanced Directives:  Patient has decided on a power of   Patient has spoken to designated power of   Patient has completed advanced directive  Preventative Screening/Counseling:      Cardiovascular:      General: Risks and Benefits Discussed and Screening Current          Diabetes:      General: Risks and Benefits Discussed and Screening Current          Colorectal Cancer:      General: Screening Not Indicated          Breast Cancer:      General: Patient Declines          Cervical Cancer:      General: Screening Not Indicated          Osteoporosis:      General: Patient Declines          AAA:      General: Screening Not Indicated          Glaucoma:      General: Risks and Benefits Discussed          HIV:      General: Screening Not Indicated          Hepatitis C:      General: Screening Not Indicated        Advanced Directives:   Patient has living will for healthcare, has durable POA for healthcare, patient has an advanced directive  5 wishes given  End of life assessment reviewed with patient       Immunizations:      Influenza: Influenza Due Today      Pneumococcal: Pneumococcal Due Today Shingrix: Risks & Benefits Discussed      Hepatitis B (Low risk patients): Series Not Indicated      Zostavax: Vaccine Status Unknown      TD: Vaccine Status Unknown      TDAP: Vaccine Status Unknown            Physical Exam:  Review of Systems   Gastrointestinal: Negative for bowel incontinence  Psychiatric/Behavioral: The patient is not nervous/anxious  Vitals:    10/17/18 1359   BP: 122/70   BP Location: Left arm   Patient Position: Sitting   Cuff Size: Adult   Pulse: (!) 108   Resp: 17   Temp: 98 2 °F (36 8 °C)   TempSrc: Tympanic   SpO2: 95%   Weight: 51 kg (112 lb 6 4 oz)   Height: 5' 2" (1 575 m)   Body mass index is 20 56 kg/m²      Physical Exam

## 2018-10-27 DIAGNOSIS — F41.9 ANXIETY: ICD-10-CM

## 2018-10-28 RX ORDER — LORAZEPAM 1 MG/1
TABLET ORAL
Qty: 60 TABLET | Refills: 0 | Status: SHIPPED | OUTPATIENT
Start: 2018-10-28 | End: 2018-11-14 | Stop reason: SDUPTHER

## 2018-10-29 DIAGNOSIS — F41.9 ANXIETY: ICD-10-CM

## 2018-10-30 RX ORDER — LORAZEPAM 1 MG/1
TABLET ORAL
Qty: 60 TABLET | Refills: 0 | Status: SHIPPED | OUTPATIENT
Start: 2018-10-30 | End: 2018-11-14 | Stop reason: SDUPTHER

## 2018-11-14 DIAGNOSIS — G89.29 OTHER CHRONIC PAIN: ICD-10-CM

## 2018-11-14 DIAGNOSIS — J44.9 CHRONIC OBSTRUCTIVE PULMONARY DISEASE, UNSPECIFIED COPD TYPE (HCC): ICD-10-CM

## 2018-11-14 DIAGNOSIS — F41.9 ANXIETY: ICD-10-CM

## 2018-11-14 DIAGNOSIS — E78.2 MIXED HYPERLIPIDEMIA: ICD-10-CM

## 2018-11-14 RX ORDER — TRAMADOL HYDROCHLORIDE 50 MG/1
50 TABLET ORAL EVERY EVENING
Qty: 30 TABLET | Refills: 0 | Status: SHIPPED | OUTPATIENT
Start: 2018-11-14 | End: 2019-01-01 | Stop reason: SDUPTHER

## 2018-11-14 RX ORDER — ALBUTEROL SULFATE 90 UG/1
2 AEROSOL, METERED RESPIRATORY (INHALATION)
Qty: 1 INHALER | Refills: 5 | Status: SHIPPED | OUTPATIENT
Start: 2018-11-14 | End: 2019-01-01 | Stop reason: SDUPTHER

## 2018-11-14 RX ORDER — ATORVASTATIN CALCIUM 20 MG/1
20 TABLET, FILM COATED ORAL DAILY
Qty: 90 TABLET | Refills: 0 | Status: SHIPPED | OUTPATIENT
Start: 2018-11-14 | End: 2019-01-01 | Stop reason: SDUPTHER

## 2018-11-14 RX ORDER — LORAZEPAM 1 MG/1
1 TABLET ORAL EVERY 12 HOURS PRN
Qty: 60 TABLET | Refills: 0 | Status: SHIPPED | OUTPATIENT
Start: 2018-11-14 | End: 2018-12-26 | Stop reason: SDUPTHER

## 2018-11-14 NOTE — TELEPHONE ENCOUNTER
From: Leonora Dhillon  Sent: 11/14/2018 10:46 AM EST  Subject: Medication Renewal Request    Monika Stacy   Ravi would like a refill of the following medications:     traMADol (ULTRAM) 50 mg tablet Amy Garzon DO]    [id number]        Medication renewals requested in this message routed separately:     albuterol (VENTOLIN HFA) 90 mcg/act inhaler Tanya Arriola DO]     atorvastatin (LIPITOR) 20 mg tablet Tanya Arriola DO]     LORazepam (ATIVAN) 1 mg tablet Tanya Arriola DO]

## 2018-11-14 NOTE — TELEPHONE ENCOUNTER
Last appt 10/17/18 - Next appt 02/20/2019    Per PDMP:  Tramadol last filled 08/13/18  Lorazepam last filled 07/13/18

## 2018-12-26 DIAGNOSIS — F41.9 ANXIETY: ICD-10-CM

## 2018-12-28 ENCOUNTER — TELEPHONE (OUTPATIENT)
Dept: FAMILY MEDICINE CLINIC | Facility: CLINIC | Age: 83
End: 2018-12-28

## 2018-12-28 NOTE — TELEPHONE ENCOUNTER
I spoke to patient - she never started the Eliquis I prescribed in October and is still taking Coumadin although she is not quite sure what the dosage is I await a call from her son to get this straightened out

## 2018-12-28 NOTE — TELEPHONE ENCOUNTER
Pt called saying she takes coumadin 1mg once a day, for the last 2 days she's been dizzy  I checked her meds and it looked like you stopped coumadin and started eliquis 2 5 in Oct Pt says she doesn't have that  Please call her

## 2018-12-29 RX ORDER — LORAZEPAM 1 MG/1
TABLET ORAL
Qty: 60 TABLET | Refills: 0 | Status: SHIPPED | OUTPATIENT
Start: 2018-12-29 | End: 2019-01-25 | Stop reason: SDUPTHER

## 2018-12-30 DIAGNOSIS — F41.9 ANXIETY: ICD-10-CM

## 2018-12-30 DIAGNOSIS — D68.51 FACTOR V LEIDEN (HCC): Primary | ICD-10-CM

## 2018-12-30 DIAGNOSIS — M15.9 GENERALIZED OSTEOARTHRITIS: ICD-10-CM

## 2018-12-30 DIAGNOSIS — E78.2 COMBINED HYPERLIPIDEMIA: ICD-10-CM

## 2018-12-30 RX ORDER — WARFARIN SODIUM 1 MG/1
TABLET ORAL
Qty: 45 TABLET | Refills: 5 | Status: SHIPPED | OUTPATIENT
Start: 2018-12-30 | End: 2019-01-01 | Stop reason: SDUPTHER

## 2018-12-30 NOTE — TELEPHONE ENCOUNTER
I spoke to son yesterday  Colby Moya is back on her Coumadin  She never got the Eliquis as it was way too expensive  She has not had an INR since October when I last saw her  He said he will take her later in the week  I will put orders in  I am also sending a prescription for Coumadin to the pharmacy for her  I believe she usually goes to [facility] so we will probably need to fax them an order  I am ordering a CMP, CBC and an INR

## 2019-01-01 ENCOUNTER — TELEPHONE (OUTPATIENT)
Dept: FAMILY MEDICINE CLINIC | Facility: CLINIC | Age: 84
End: 2019-01-01

## 2019-01-01 ENCOUNTER — ANTICOAG VISIT (OUTPATIENT)
Dept: FAMILY MEDICINE CLINIC | Facility: CLINIC | Age: 84
End: 2019-01-01

## 2019-01-01 ENCOUNTER — OFFICE VISIT (OUTPATIENT)
Dept: FAMILY MEDICINE CLINIC | Facility: CLINIC | Age: 84
End: 2019-01-01
Payer: MEDICARE

## 2019-01-01 ENCOUNTER — DOCUMENTATION (OUTPATIENT)
Dept: FAMILY MEDICINE CLINIC | Facility: CLINIC | Age: 84
End: 2019-01-01

## 2019-01-01 ENCOUNTER — APPOINTMENT (OUTPATIENT)
Dept: RADIOLOGY | Facility: MEDICAL CENTER | Age: 84
End: 2019-01-01
Payer: COMMERCIAL

## 2019-01-01 ENCOUNTER — APPOINTMENT (EMERGENCY)
Dept: RADIOLOGY | Facility: HOSPITAL | Age: 84
DRG: 463 | End: 2019-01-01
Payer: MEDICARE

## 2019-01-01 ENCOUNTER — HOSPITAL ENCOUNTER (INPATIENT)
Facility: HOSPITAL | Age: 84
LOS: 5 days | Discharge: NURSING, REHAB OR LONG-TERM CARE | DRG: 463 | End: 2019-12-03
Attending: EMERGENCY MEDICINE | Admitting: INTERNAL MEDICINE
Payer: MEDICARE

## 2019-01-01 ENCOUNTER — OFFICE VISIT (OUTPATIENT)
Dept: OBGYN CLINIC | Facility: MEDICAL CENTER | Age: 84
End: 2019-01-01
Payer: MEDICARE

## 2019-01-01 ENCOUNTER — APPOINTMENT (EMERGENCY)
Dept: CT IMAGING | Facility: HOSPITAL | Age: 84
DRG: 463 | End: 2019-01-01
Payer: MEDICARE

## 2019-01-01 ENCOUNTER — TELEPHONE (OUTPATIENT)
Dept: OTHER | Facility: OTHER | Age: 84
End: 2019-01-01

## 2019-01-01 VITALS
RESPIRATION RATE: 18 BRPM | SYSTOLIC BLOOD PRESSURE: 118 MMHG | OXYGEN SATURATION: 96 % | BODY MASS INDEX: 21.77 KG/M2 | DIASTOLIC BLOOD PRESSURE: 57 MMHG | TEMPERATURE: 98.7 F | HEART RATE: 91 BPM | WEIGHT: 119.05 LBS

## 2019-01-01 VITALS
BODY MASS INDEX: 20.5 KG/M2 | DIASTOLIC BLOOD PRESSURE: 84 MMHG | TEMPERATURE: 98 F | RESPIRATION RATE: 20 BRPM | WEIGHT: 112.1 LBS | HEART RATE: 100 BPM | SYSTOLIC BLOOD PRESSURE: 136 MMHG

## 2019-01-01 VITALS
RESPIRATION RATE: 15 BRPM | HEART RATE: 103 BPM | TEMPERATURE: 98.2 F | BODY MASS INDEX: 20.91 KG/M2 | OXYGEN SATURATION: 94 % | HEIGHT: 62 IN | DIASTOLIC BLOOD PRESSURE: 68 MMHG | SYSTOLIC BLOOD PRESSURE: 132 MMHG | WEIGHT: 113.6 LBS

## 2019-01-01 VITALS — WEIGHT: 118 LBS | BODY MASS INDEX: 19.66 KG/M2 | HEIGHT: 65 IN

## 2019-01-01 DIAGNOSIS — F41.9 ANXIETY: ICD-10-CM

## 2019-01-01 DIAGNOSIS — G89.29 OTHER CHRONIC PAIN: ICD-10-CM

## 2019-01-01 DIAGNOSIS — J44.9 CHRONIC OBSTRUCTIVE PULMONARY DISEASE, UNSPECIFIED COPD TYPE (HCC): ICD-10-CM

## 2019-01-01 DIAGNOSIS — S32.509A PUBIC BONE FRACTURE (HCC): ICD-10-CM

## 2019-01-01 DIAGNOSIS — D68.51 FACTOR V LEIDEN (HCC): ICD-10-CM
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
## 2019-01-01 DIAGNOSIS — Z23 NEED FOR SHINGLES VACCINE: ICD-10-CM

## 2019-01-01 DIAGNOSIS — D68.51 FACTOR V LEIDEN (HCC): Primary | ICD-10-CM

## 2019-01-01 DIAGNOSIS — R26.2 AMBULATORY DYSFUNCTION: ICD-10-CM

## 2019-01-01 DIAGNOSIS — E78.2 COMBINED HYPERLIPIDEMIA: ICD-10-CM

## 2019-01-01 DIAGNOSIS — M16.12 PRIMARY OSTEOARTHRITIS OF LEFT HIP: ICD-10-CM

## 2019-01-01 DIAGNOSIS — I87.8 VENOUS STASIS: ICD-10-CM

## 2019-01-01 DIAGNOSIS — M25.551 PAIN IN RIGHT HIP: Primary | ICD-10-CM

## 2019-01-01 DIAGNOSIS — D68.51 FACTOR V LEIDEN MUTATION (HCC): Primary | ICD-10-CM

## 2019-01-01 DIAGNOSIS — J45.30 MILD PERSISTENT ASTHMA WITHOUT COMPLICATION: ICD-10-CM

## 2019-01-01 DIAGNOSIS — M79.671 PAIN IN BOTH FEET: Primary | ICD-10-CM

## 2019-01-01 DIAGNOSIS — E78.2 MIXED HYPERLIPIDEMIA: ICD-10-CM

## 2019-01-01 DIAGNOSIS — E44.0 MODERATE PROTEIN-CALORIE MALNUTRITION (HCC): Chronic | ICD-10-CM

## 2019-01-01 DIAGNOSIS — M54.9 BACK PAIN: ICD-10-CM

## 2019-01-01 DIAGNOSIS — B35.1 ONYCHOMYCOSIS: ICD-10-CM

## 2019-01-01 DIAGNOSIS — R26.2 AMBULATORY DYSFUNCTION: Primary | ICD-10-CM

## 2019-01-01 DIAGNOSIS — J40 BRONCHITIS: ICD-10-CM

## 2019-01-01 DIAGNOSIS — M25.551 PAIN IN RIGHT HIP: ICD-10-CM

## 2019-01-01 DIAGNOSIS — M79.672 PAIN IN BOTH FEET: Primary | ICD-10-CM

## 2019-01-01 LAB
ANION GAP SERPL CALCULATED.3IONS-SCNC: 10 MMOL/L (ref 4–13)
ANION GAP SERPL CALCULATED.3IONS-SCNC: 6 MMOL/L (ref 4–13)
ANION GAP SERPL CALCULATED.3IONS-SCNC: 6 MMOL/L (ref 4–13)
ANION GAP SERPL CALCULATED.3IONS-SCNC: 8 MMOL/L (ref 4–13)
ANION GAP SERPL CALCULATED.3IONS-SCNC: 9 MMOL/L (ref 4–13)
APTT PPP: 38 SECONDS (ref 23–37)
BACTERIA BLD CULT: NORMAL
BASOPHILS # BLD AUTO: 0.01 THOUSANDS/ΜL (ref 0–0.1)
BASOPHILS # BLD AUTO: 0.02 THOUSANDS/ΜL (ref 0–0.1)
BASOPHILS # BLD AUTO: 0.02 THOUSANDS/ΜL (ref 0–0.1)
BASOPHILS NFR BLD AUTO: 0 % (ref 0–1)
BUN SERPL-MCNC: 20 MG/DL (ref 5–25)
BUN SERPL-MCNC: 22 MG/DL (ref 5–25)
BUN SERPL-MCNC: 22 MG/DL (ref 5–25)
BUN SERPL-MCNC: 23 MG/DL (ref 5–25)
BUN SERPL-MCNC: 34 MG/DL (ref 5–25)
CALCIUM SERPL-MCNC: 8.2 MG/DL (ref 8.3–10.1)
CALCIUM SERPL-MCNC: 8.3 MG/DL (ref 8.3–10.1)
CALCIUM SERPL-MCNC: 8.5 MG/DL (ref 8.3–10.1)
CALCIUM SERPL-MCNC: 8.6 MG/DL (ref 8.3–10.1)
CALCIUM SERPL-MCNC: 8.8 MG/DL (ref 8.3–10.1)
CHLORIDE SERPL-SCNC: 101 MMOL/L (ref 100–108)
CHLORIDE SERPL-SCNC: 103 MMOL/L (ref 100–108)
CHLORIDE SERPL-SCNC: 104 MMOL/L (ref 100–108)
CO2 SERPL-SCNC: 25 MMOL/L (ref 21–32)
CO2 SERPL-SCNC: 26 MMOL/L (ref 21–32)
CO2 SERPL-SCNC: 26 MMOL/L (ref 21–32)
CO2 SERPL-SCNC: 28 MMOL/L (ref 21–32)
CO2 SERPL-SCNC: 28 MMOL/L (ref 21–32)
CREAT SERPL-MCNC: 0.64 MG/DL (ref 0.6–1.3)
CREAT SERPL-MCNC: 0.7 MG/DL (ref 0.6–1.3)
CREAT SERPL-MCNC: 0.72 MG/DL (ref 0.6–1.3)
CREAT SERPL-MCNC: 0.79 MG/DL (ref 0.6–1.3)
CREAT SERPL-MCNC: 0.97 MG/DL (ref 0.6–1.3)
EOSINOPHIL # BLD AUTO: 0.11 THOUSAND/ΜL (ref 0–0.61)
EOSINOPHIL # BLD AUTO: 0.28 THOUSAND/ΜL (ref 0–0.61)
EOSINOPHIL # BLD AUTO: 0.39 THOUSAND/ΜL (ref 0–0.61)
EOSINOPHIL NFR BLD AUTO: 1 % (ref 0–6)
EOSINOPHIL NFR BLD AUTO: 3 % (ref 0–6)
EOSINOPHIL NFR BLD AUTO: 4 % (ref 0–6)
ERYTHROCYTE [DISTWIDTH] IN BLOOD BY AUTOMATED COUNT: 13.8 % (ref 11.6–15.1)
ERYTHROCYTE [DISTWIDTH] IN BLOOD BY AUTOMATED COUNT: 14 % (ref 11.6–15.1)
ERYTHROCYTE [DISTWIDTH] IN BLOOD BY AUTOMATED COUNT: 14 % (ref 11.6–15.1)
ERYTHROCYTE [DISTWIDTH] IN BLOOD BY AUTOMATED COUNT: 14.3 % (ref 11.6–15.1)
ERYTHROCYTE [DISTWIDTH] IN BLOOD BY AUTOMATED COUNT: 14.3 % (ref 11.6–15.1)
GFR SERPL CREATININE-BSD FRML MDRD: 50 ML/MIN/1.73SQ M
GFR SERPL CREATININE-BSD FRML MDRD: 64 ML/MIN/1.73SQ M
GFR SERPL CREATININE-BSD FRML MDRD: 72 ML/MIN/1.73SQ M
GFR SERPL CREATININE-BSD FRML MDRD: 74 ML/MIN/1.73SQ M
GFR SERPL CREATININE-BSD FRML MDRD: 77 ML/MIN/1.73SQ M
GLUCOSE SERPL-MCNC: 101 MG/DL (ref 65–140)
GLUCOSE SERPL-MCNC: 103 MG/DL (ref 65–140)
GLUCOSE SERPL-MCNC: 110 MG/DL (ref 65–140)
GLUCOSE SERPL-MCNC: 113 MG/DL (ref 65–140)
GLUCOSE SERPL-MCNC: 119 MG/DL (ref 65–140)
HCT VFR BLD AUTO: 34.5 % (ref 34.8–46.1)
HCT VFR BLD AUTO: 35 % (ref 34.8–46.1)
HCT VFR BLD AUTO: 36.4 % (ref 34.8–46.1)
HCT VFR BLD AUTO: 37 % (ref 34.8–46.1)
HCT VFR BLD AUTO: 39.1 % (ref 34.8–46.1)
HGB BLD-MCNC: 11 G/DL (ref 11.5–15.4)
HGB BLD-MCNC: 11.2 G/DL (ref 11.5–15.4)
HGB BLD-MCNC: 11.4 G/DL (ref 11.5–15.4)
HGB BLD-MCNC: 11.6 G/DL (ref 11.5–15.4)
HGB BLD-MCNC: 12.5 G/DL (ref 11.5–15.4)
IMM GRANULOCYTES # BLD AUTO: 0.06 THOUSAND/UL (ref 0–0.2)
IMM GRANULOCYTES # BLD AUTO: 0.06 THOUSAND/UL (ref 0–0.2)
IMM GRANULOCYTES # BLD AUTO: 0.08 THOUSAND/UL (ref 0–0.2)
IMM GRANULOCYTES NFR BLD AUTO: 1 % (ref 0–2)
INR PPP: 1.3 (ref 0.86–1.17)
INR PPP: 1.4
INR PPP: 1.4 (ref 0.86–1.17)
INR PPP: 2.02 (ref 0.84–1.19)
INR PPP: 2.08 (ref 0.84–1.19)
INR PPP: 2.1 (ref 0.84–1.19)
INR PPP: 2.1 (ref 0.84–1.19)
INR PPP: 2.4 (ref 0.84–1.19)
INR PPP: 2.46 (ref 0.84–1.19)
INR PPP: 2.76 (ref 0.84–1.19)
INR PPP: 2.89 (ref 0.84–1.19)
LYMPHOCYTES # BLD AUTO: 1.09 THOUSANDS/ΜL (ref 0.6–4.47)
LYMPHOCYTES # BLD AUTO: 1.12 THOUSANDS/ΜL (ref 0.6–4.47)
LYMPHOCYTES # BLD AUTO: 1.52 THOUSANDS/ΜL (ref 0.6–4.47)
LYMPHOCYTES NFR BLD AUTO: 10 % (ref 14–44)
LYMPHOCYTES NFR BLD AUTO: 17 % (ref 14–44)
LYMPHOCYTES NFR BLD AUTO: 8 % (ref 14–44)
MCH RBC QN AUTO: 32 PG (ref 26.8–34.3)
MCH RBC QN AUTO: 32.1 PG (ref 26.8–34.3)
MCH RBC QN AUTO: 32.2 PG (ref 26.8–34.3)
MCH RBC QN AUTO: 32.3 PG (ref 26.8–34.3)
MCH RBC QN AUTO: 32.4 PG (ref 26.8–34.3)
MCHC RBC AUTO-ENTMCNC: 31.3 G/DL (ref 31.4–37.4)
MCHC RBC AUTO-ENTMCNC: 31.4 G/DL (ref 31.4–37.4)
MCHC RBC AUTO-ENTMCNC: 31.9 G/DL (ref 31.4–37.4)
MCHC RBC AUTO-ENTMCNC: 32 G/DL (ref 31.4–37.4)
MCHC RBC AUTO-ENTMCNC: 32 G/DL (ref 31.4–37.4)
MCV RBC AUTO: 101 FL (ref 82–98)
MCV RBC AUTO: 101 FL (ref 82–98)
MCV RBC AUTO: 102 FL (ref 82–98)
MCV RBC AUTO: 102 FL (ref 82–98)
MCV RBC AUTO: 103 FL (ref 82–98)
MONOCYTES # BLD AUTO: 1.07 THOUSAND/ΜL (ref 0.17–1.22)
MONOCYTES # BLD AUTO: 1.32 THOUSAND/ΜL (ref 0.17–1.22)
MONOCYTES # BLD AUTO: 1.4 THOUSAND/ΜL (ref 0.17–1.22)
MONOCYTES NFR BLD AUTO: 10 % (ref 4–12)
MONOCYTES NFR BLD AUTO: 12 % (ref 4–12)
MONOCYTES NFR BLD AUTO: 13 % (ref 4–12)
NEUTROPHILS # BLD AUTO: 10.67 THOUSANDS/ΜL (ref 1.85–7.62)
NEUTROPHILS # BLD AUTO: 5.8 THOUSANDS/ΜL (ref 1.85–7.62)
NEUTROPHILS # BLD AUTO: 8.27 THOUSANDS/ΜL (ref 1.85–7.62)
NEUTS SEG NFR BLD AUTO: 66 % (ref 43–75)
NEUTS SEG NFR BLD AUTO: 73 % (ref 43–75)
NEUTS SEG NFR BLD AUTO: 80 % (ref 43–75)
NRBC BLD AUTO-RTO: 0 /100 WBCS
PLATELET # BLD AUTO: 121 THOUSANDS/UL (ref 149–390)
PLATELET # BLD AUTO: 137 THOUSANDS/UL (ref 149–390)
PLATELET # BLD AUTO: 137 THOUSANDS/UL (ref 149–390)
PLATELET # BLD AUTO: 187 THOUSANDS/UL (ref 149–390)
PLATELET # BLD AUTO: 233 THOUSANDS/UL (ref 149–390)
PMV BLD AUTO: 10 FL (ref 8.9–12.7)
PMV BLD AUTO: 10.2 FL (ref 8.9–12.7)
PMV BLD AUTO: 10.2 FL (ref 8.9–12.7)
PMV BLD AUTO: 10.3 FL (ref 8.9–12.7)
PMV BLD AUTO: 9.5 FL (ref 8.9–12.7)
POTASSIUM SERPL-SCNC: 3.4 MMOL/L (ref 3.5–5.3)
POTASSIUM SERPL-SCNC: 3.7 MMOL/L (ref 3.5–5.3)
POTASSIUM SERPL-SCNC: 3.8 MMOL/L (ref 3.5–5.3)
POTASSIUM SERPL-SCNC: 4 MMOL/L (ref 3.5–5.3)
POTASSIUM SERPL-SCNC: 4 MMOL/L (ref 3.5–5.3)
PROTHROMBIN TIME: 13.7 SEC (ref 9–11.5)
PROTHROMBIN TIME: 23.2 SECONDS (ref 11.6–14.5)
PROTHROMBIN TIME: 23.8 SECONDS (ref 11.6–14.5)
PROTHROMBIN TIME: 24 SECONDS (ref 11.6–14.5)
PROTHROMBIN TIME: 27.2 SECONDS (ref 11.6–14.5)
PROTHROMBIN TIME: 29.8 SECONDS (ref 11.6–14.5)
PROTHROMBIN TIME: 30.9 SECONDS (ref 11.6–14.5)
RBC # BLD AUTO: 3.4 MILLION/UL (ref 3.81–5.12)
RBC # BLD AUTO: 3.48 MILLION/UL (ref 3.81–5.12)
RBC # BLD AUTO: 3.53 MILLION/UL (ref 3.81–5.12)
RBC # BLD AUTO: 3.62 MILLION/UL (ref 3.81–5.12)
RBC # BLD AUTO: 3.89 MILLION/UL (ref 3.81–5.12)
SODIUM SERPL-SCNC: 136 MMOL/L (ref 136–145)
SODIUM SERPL-SCNC: 136 MMOL/L (ref 136–145)
SODIUM SERPL-SCNC: 137 MMOL/L (ref 136–145)
SODIUM SERPL-SCNC: 138 MMOL/L (ref 136–145)
SODIUM SERPL-SCNC: 139 MMOL/L (ref 136–145)
WBC # BLD AUTO: 11.12 THOUSAND/UL (ref 4.31–10.16)
WBC # BLD AUTO: 13.32 THOUSAND/UL (ref 4.31–10.16)
WBC # BLD AUTO: 8.46 THOUSAND/UL (ref 4.31–10.16)
WBC # BLD AUTO: 8.85 THOUSAND/UL (ref 4.31–10.16)
WBC # BLD AUTO: 9.18 THOUSAND/UL (ref 4.31–10.16)

## 2019-01-01 PROCEDURE — 94760 N-INVAS EAR/PLS OXIMETRY 1: CPT

## 2019-01-01 PROCEDURE — 80048 BASIC METABOLIC PNL TOTAL CA: CPT | Performed by: EMERGENCY MEDICINE

## 2019-01-01 PROCEDURE — 0HBRXZZ EXCISION OF TOE NAIL, EXTERNAL APPROACH: ICD-10-PCS | Performed by: STUDENT IN AN ORGANIZED HEALTH CARE EDUCATION/TRAINING PROGRAM

## 2019-01-01 PROCEDURE — 11721 DEBRIDE NAIL 6 OR MORE: CPT | Performed by: PODIATRIST

## 2019-01-01 PROCEDURE — 94640 AIRWAY INHALATION TREATMENT: CPT

## 2019-01-01 PROCEDURE — 85610 PROTHROMBIN TIME: CPT | Performed by: INTERNAL MEDICINE

## 2019-01-01 PROCEDURE — 99223 1ST HOSP IP/OBS HIGH 75: CPT | Performed by: PODIATRIST

## 2019-01-01 PROCEDURE — 99285 EMERGENCY DEPT VISIT HI MDM: CPT

## 2019-01-01 PROCEDURE — 99214 OFFICE O/P EST MOD 30 MIN: CPT | Performed by: FAMILY MEDICINE

## 2019-01-01 PROCEDURE — 85027 COMPLETE CBC AUTOMATED: CPT | Performed by: INTERNAL MEDICINE

## 2019-01-01 PROCEDURE — 85610 PROTHROMBIN TIME: CPT | Performed by: NURSE PRACTITIONER

## 2019-01-01 PROCEDURE — 97530 THERAPEUTIC ACTIVITIES: CPT

## 2019-01-01 PROCEDURE — 85025 COMPLETE CBC W/AUTO DIFF WBC: CPT | Performed by: INTERNAL MEDICINE

## 2019-01-01 PROCEDURE — 99285 EMERGENCY DEPT VISIT HI MDM: CPT | Performed by: EMERGENCY MEDICINE

## 2019-01-01 PROCEDURE — 85025 COMPLETE CBC W/AUTO DIFF WBC: CPT | Performed by: NURSE PRACTITIONER

## 2019-01-01 PROCEDURE — G8988 SELF CARE GOAL STATUS: HCPCS

## 2019-01-01 PROCEDURE — 99232 SBSQ HOSP IP/OBS MODERATE 35: CPT | Performed by: INTERNAL MEDICINE

## 2019-01-01 PROCEDURE — 87040 BLOOD CULTURE FOR BACTERIA: CPT | Performed by: INTERNAL MEDICINE

## 2019-01-01 PROCEDURE — 85610 PROTHROMBIN TIME: CPT | Performed by: EMERGENCY MEDICINE

## 2019-01-01 PROCEDURE — 97167 OT EVAL HIGH COMPLEX 60 MIN: CPT

## 2019-01-01 PROCEDURE — 73502 X-RAY EXAM HIP UNI 2-3 VIEWS: CPT

## 2019-01-01 PROCEDURE — G8979 MOBILITY GOAL STATUS: HCPCS

## 2019-01-01 PROCEDURE — 80048 BASIC METABOLIC PNL TOTAL CA: CPT | Performed by: INTERNAL MEDICINE

## 2019-01-01 PROCEDURE — 85025 COMPLETE CBC W/AUTO DIFF WBC: CPT | Performed by: EMERGENCY MEDICINE

## 2019-01-01 PROCEDURE — 99213 OFFICE O/P EST LOW 20 MIN: CPT | Performed by: ORTHOPAEDIC SURGERY

## 2019-01-01 PROCEDURE — G8978 MOBILITY CURRENT STATUS: HCPCS

## 2019-01-01 PROCEDURE — 99239 HOSP IP/OBS DSCHRG MGMT >30: CPT | Performed by: INTERNAL MEDICINE

## 2019-01-01 PROCEDURE — 97110 THERAPEUTIC EXERCISES: CPT

## 2019-01-01 PROCEDURE — 80048 BASIC METABOLIC PNL TOTAL CA: CPT | Performed by: NURSE PRACTITIONER

## 2019-01-01 PROCEDURE — 71045 X-RAY EXAM CHEST 1 VIEW: CPT

## 2019-01-01 PROCEDURE — 74177 CT ABD & PELVIS W/CONTRAST: CPT

## 2019-01-01 PROCEDURE — 0JBN0ZZ EXCISION OF RIGHT LOWER LEG SUBCUTANEOUS TISSUE AND FASCIA, OPEN APPROACH: ICD-10-PCS | Performed by: STUDENT IN AN ORGANIZED HEALTH CARE EDUCATION/TRAINING PROGRAM

## 2019-01-01 PROCEDURE — 85730 THROMBOPLASTIN TIME PARTIAL: CPT | Performed by: EMERGENCY MEDICINE

## 2019-01-01 PROCEDURE — NC001 PR NO CHARGE: Performed by: STUDENT IN AN ORGANIZED HEALTH CARE EDUCATION/TRAINING PROGRAM

## 2019-01-01 PROCEDURE — G8987 SELF CARE CURRENT STATUS: HCPCS

## 2019-01-01 PROCEDURE — 11042 DBRDMT SUBQ TIS 1ST 20SQCM/<: CPT | Performed by: PODIATRIST

## 2019-01-01 PROCEDURE — 36415 COLL VENOUS BLD VENIPUNCTURE: CPT | Performed by: EMERGENCY MEDICINE

## 2019-01-01 PROCEDURE — 97163 PT EVAL HIGH COMPLEX 45 MIN: CPT

## 2019-01-01 PROCEDURE — 99222 1ST HOSP IP/OBS MODERATE 55: CPT | Performed by: PHYSICIAN ASSISTANT

## 2019-01-01 PROCEDURE — 99223 1ST HOSP IP/OBS HIGH 75: CPT | Performed by: NURSE PRACTITIONER

## 2019-01-01 RX ORDER — WARFARIN SODIUM 2 MG/1
2 TABLET ORAL
Status: DISCONTINUED | OUTPATIENT
Start: 2019-01-01 | End: 2019-01-01 | Stop reason: HOSPADM

## 2019-01-01 RX ORDER — WARFARIN SODIUM 4 MG/1
4 TABLET ORAL
Status: DISCONTINUED | OUTPATIENT
Start: 2019-01-01 | End: 2019-01-01

## 2019-01-01 RX ORDER — WARFARIN SODIUM 3 MG/1
3 TABLET ORAL
Status: DISCONTINUED | OUTPATIENT
Start: 2019-01-01 | End: 2019-01-01 | Stop reason: HOSPADM

## 2019-01-01 RX ORDER — BENZONATATE 100 MG/1
100 CAPSULE ORAL 3 TIMES DAILY PRN
Qty: 20 CAPSULE | Refills: 0 | Status: SHIPPED | OUTPATIENT
Start: 2019-01-01

## 2019-01-01 RX ORDER — LORAZEPAM 1 MG/1
TABLET ORAL
Qty: 60 TABLET | Refills: 0 | Status: SHIPPED | OUTPATIENT
Start: 2019-01-01 | End: 2019-01-01 | Stop reason: SDUPTHER

## 2019-01-01 RX ORDER — ATORVASTATIN CALCIUM 20 MG/1
TABLET, FILM COATED ORAL
Qty: 90 TABLET | Refills: 0 | Status: SHIPPED | OUTPATIENT
Start: 2019-01-01 | End: 2019-01-01 | Stop reason: SDUPTHER

## 2019-01-01 RX ORDER — LORAZEPAM 1 MG/1
TABLET ORAL
Qty: 60 TABLET | Refills: 0 | OUTPATIENT
Start: 2019-01-01

## 2019-01-01 RX ORDER — OXYCODONE HYDROCHLORIDE 5 MG/1
2.5 TABLET ORAL EVERY 4 HOURS PRN
Status: DISCONTINUED | OUTPATIENT
Start: 2019-01-01 | End: 2019-01-01 | Stop reason: HOSPADM

## 2019-01-01 RX ORDER — WARFARIN SODIUM 1 MG/1
TABLET ORAL
Qty: 90 TABLET | Refills: 5 | Status: SHIPPED | OUTPATIENT
Start: 2019-01-01 | End: 2020-01-01 | Stop reason: SDUPTHER

## 2019-01-01 RX ORDER — AMOXICILLIN 250 MG
1 CAPSULE ORAL
Status: DISCONTINUED | OUTPATIENT
Start: 2019-01-01 | End: 2019-01-01 | Stop reason: HOSPADM

## 2019-01-01 RX ORDER — LORAZEPAM 1 MG/1
1 TABLET ORAL EVERY 12 HOURS PRN
Status: DISCONTINUED | OUTPATIENT
Start: 2019-01-01 | End: 2019-01-01 | Stop reason: HOSPADM

## 2019-01-01 RX ORDER — ALBUTEROL SULFATE 90 UG/1
2 AEROSOL, METERED RESPIRATORY (INHALATION)
Qty: 1 INHALER | Refills: 0 | Status: CANCELLED | OUTPATIENT
Start: 2019-01-01

## 2019-01-01 RX ORDER — WARFARIN SODIUM 1 MG/1
TABLET ORAL
Qty: 90 TABLET | Refills: 2 | Status: SHIPPED | OUTPATIENT
Start: 2019-01-01 | End: 2019-01-01 | Stop reason: SDUPTHER

## 2019-01-01 RX ORDER — TRAMADOL HYDROCHLORIDE 50 MG/1
TABLET ORAL
Qty: 30 TABLET | Refills: 0 | Status: SHIPPED | OUTPATIENT
Start: 2019-01-01 | End: 2019-01-01 | Stop reason: SDUPTHER

## 2019-01-01 RX ORDER — ACETAMINOPHEN 325 MG/1
650 TABLET ORAL ONCE AS NEEDED
Status: DISCONTINUED | OUTPATIENT
Start: 2019-01-01 | End: 2019-01-01

## 2019-01-01 RX ORDER — TRAMADOL HYDROCHLORIDE 50 MG/1
50 TABLET ORAL EVERY 6 HOURS PRN
Status: DISCONTINUED | OUTPATIENT
Start: 2019-01-01 | End: 2019-01-01 | Stop reason: SDUPTHER

## 2019-01-01 RX ORDER — TRAMADOL HYDROCHLORIDE 50 MG/1
TABLET ORAL
Qty: 30 TABLET | Refills: 0 | OUTPATIENT
Start: 2019-01-01

## 2019-01-01 RX ORDER — TRAMADOL HYDROCHLORIDE 50 MG/1
50 TABLET ORAL EVERY EVENING
Qty: 30 TABLET | Refills: 0 | Status: SHIPPED | OUTPATIENT
Start: 2019-01-01 | End: 2019-01-01 | Stop reason: SDUPTHER

## 2019-01-01 RX ORDER — WARFARIN SODIUM 3 MG/1
3 TABLET ORAL
Status: DISCONTINUED | OUTPATIENT
Start: 2019-01-01 | End: 2019-01-01

## 2019-01-01 RX ORDER — LORAZEPAM 1 MG/1
1 TABLET ORAL EVERY 12 HOURS PRN
Qty: 60 TABLET | Refills: 0 | Status: SHIPPED | OUTPATIENT
Start: 2019-01-01 | End: 2019-01-01 | Stop reason: SDUPTHER

## 2019-01-01 RX ORDER — CEFUROXIME AXETIL 500 MG/1
500 TABLET ORAL EVERY 12 HOURS SCHEDULED
Qty: 20 TABLET | Refills: 0 | Status: SHIPPED | OUTPATIENT
Start: 2019-01-01 | End: 2019-01-01

## 2019-01-01 RX ORDER — OXYCODONE HYDROCHLORIDE 5 MG/1
2.5 TABLET ORAL EVERY 4 HOURS PRN
Qty: 225 EACH | Refills: 0 | Status: SHIPPED | OUTPATIENT
Start: 2019-01-01 | End: 2019-01-01

## 2019-01-01 RX ORDER — TRAMADOL HYDROCHLORIDE 50 MG/1
50 TABLET ORAL ONCE
Status: COMPLETED | OUTPATIENT
Start: 2019-01-01 | End: 2019-01-01

## 2019-01-01 RX ORDER — TRAMADOL HYDROCHLORIDE 50 MG/1
TABLET ORAL
Qty: 30 TABLET | Refills: 0 | Status: SHIPPED | OUTPATIENT
Start: 2019-01-01 | End: 2019-01-01 | Stop reason: HOSPADM

## 2019-01-01 RX ORDER — WARFARIN SODIUM 1 MG/1
TABLET ORAL
Qty: 50 TABLET | Refills: 0
Start: 2019-01-01

## 2019-01-01 RX ORDER — ALBUTEROL SULFATE 90 UG/1
2 AEROSOL, METERED RESPIRATORY (INHALATION)
Qty: 1 INHALER | Refills: 5 | Status: SHIPPED | OUTPATIENT
Start: 2019-01-01 | End: 2019-01-01 | Stop reason: SDUPTHER

## 2019-01-01 RX ORDER — OXYCODONE HYDROCHLORIDE 5 MG/1
2.5 TABLET ORAL EVERY 4 HOURS PRN
Qty: 10 TABLET | Refills: 0 | Status: SHIPPED | OUTPATIENT
Start: 2019-01-01 | End: 2019-01-01

## 2019-01-01 RX ORDER — LORAZEPAM 1 MG/1
1 TABLET ORAL EVERY 12 HOURS PRN
Qty: 60 TABLET | Refills: 1 | OUTPATIENT
Start: 2019-01-01

## 2019-01-01 RX ORDER — ATORVASTATIN CALCIUM 20 MG/1
TABLET, FILM COATED ORAL
Qty: 90 TABLET | Refills: 0 | Status: SHIPPED | OUTPATIENT
Start: 2019-01-01

## 2019-01-01 RX ORDER — HYDROMORPHONE HCL/PF 1 MG/ML
0.2 SYRINGE (ML) INJECTION EVERY 4 HOURS PRN
Status: DISCONTINUED | OUTPATIENT
Start: 2019-01-01 | End: 2019-01-01 | Stop reason: HOSPADM

## 2019-01-01 RX ORDER — POTASSIUM CHLORIDE 20MEQ/15ML
40 LIQUID (ML) ORAL ONCE
Status: COMPLETED | OUTPATIENT
Start: 2019-01-01 | End: 2019-01-01

## 2019-01-01 RX ORDER — TRAMADOL HYDROCHLORIDE 50 MG/1
50 TABLET ORAL EVERY EVENING
Qty: 30 TABLET | Refills: 0 | Status: CANCELLED | OUTPATIENT
Start: 2019-01-01

## 2019-01-01 RX ORDER — LISINOPRIL 10 MG/1
10 TABLET ORAL DAILY
Status: DISCONTINUED | OUTPATIENT
Start: 2019-01-01 | End: 2019-01-01 | Stop reason: HOSPADM

## 2019-01-01 RX ORDER — ALBUTEROL SULFATE 90 UG/1
2 AEROSOL, METERED RESPIRATORY (INHALATION)
Qty: 1 INHALER | Refills: 1 | Status: SHIPPED | OUTPATIENT
Start: 2019-01-01 | End: 2019-01-01 | Stop reason: SDUPTHER

## 2019-01-01 RX ORDER — LORAZEPAM 1 MG/1
TABLET ORAL
Qty: 4 TABLET | Refills: 0 | OUTPATIENT
Start: 2019-01-01

## 2019-01-01 RX ORDER — WARFARIN SODIUM 1 MG/1
TABLET ORAL
Qty: 50 TABLET | Refills: 5
Start: 2019-01-01 | End: 2019-01-01 | Stop reason: SDUPTHER

## 2019-01-01 RX ORDER — OXYCODONE HYDROCHLORIDE 5 MG/1
5 TABLET ORAL EVERY 4 HOURS PRN
Status: DISCONTINUED | OUTPATIENT
Start: 2019-01-01 | End: 2019-01-01 | Stop reason: HOSPADM

## 2019-01-01 RX ORDER — ALBUTEROL SULFATE 90 UG/1
2 AEROSOL, METERED RESPIRATORY (INHALATION) EVERY 6 HOURS PRN
Qty: 1 INHALER | Refills: 5 | Status: SHIPPED | OUTPATIENT
Start: 2019-01-01 | End: 2020-01-01 | Stop reason: SDUPTHER

## 2019-01-01 RX ORDER — BENZONATATE 100 MG/1
100 CAPSULE ORAL 3 TIMES DAILY PRN
Status: DISCONTINUED | OUTPATIENT
Start: 2019-01-01 | End: 2019-01-01 | Stop reason: HOSPADM

## 2019-01-01 RX ORDER — ALBUTEROL SULFATE 2.5 MG/3ML
2.5 SOLUTION RESPIRATORY (INHALATION) 2 TIMES DAILY
Qty: 25 VIAL | Refills: 1 | Status: SHIPPED | OUTPATIENT
Start: 2019-01-01 | End: 2020-01-01 | Stop reason: SDUPTHER

## 2019-01-01 RX ORDER — MAGNESIUM HYDROXIDE/ALUMINUM HYDROXICE/SIMETHICONE 120; 1200; 1200 MG/30ML; MG/30ML; MG/30ML
30 SUSPENSION ORAL EVERY 6 HOURS PRN
Status: DISCONTINUED | OUTPATIENT
Start: 2019-01-01 | End: 2019-01-01 | Stop reason: HOSPADM

## 2019-01-01 RX ORDER — ALBUTEROL SULFATE 90 UG/1
2 AEROSOL, METERED RESPIRATORY (INHALATION) EVERY 6 HOURS PRN
Status: DISCONTINUED | OUTPATIENT
Start: 2019-01-01 | End: 2019-01-01 | Stop reason: HOSPADM

## 2019-01-01 RX ORDER — LORAZEPAM 1 MG/1
TABLET ORAL
Qty: 60 TABLET | Refills: 0 | Status: SHIPPED | OUTPATIENT
Start: 2019-01-01 | End: 2020-01-01 | Stop reason: SDUPTHER

## 2019-01-01 RX ORDER — ONDANSETRON 2 MG/ML
4 INJECTION INTRAMUSCULAR; INTRAVENOUS EVERY 6 HOURS PRN
Status: DISCONTINUED | OUTPATIENT
Start: 2019-01-01 | End: 2019-01-01 | Stop reason: HOSPADM

## 2019-01-01 RX ORDER — ALBUTEROL SULFATE 2.5 MG/3ML
2.5 SOLUTION RESPIRATORY (INHALATION) 2 TIMES DAILY
Status: DISCONTINUED | OUTPATIENT
Start: 2019-01-01 | End: 2019-01-01 | Stop reason: HOSPADM

## 2019-01-01 RX ORDER — ACETAMINOPHEN 325 MG/1
650 TABLET ORAL EVERY 6 HOURS PRN
Status: DISCONTINUED | OUTPATIENT
Start: 2019-01-01 | End: 2019-01-01 | Stop reason: HOSPADM

## 2019-01-01 RX ORDER — WARFARIN SODIUM 1 MG/1
TABLET ORAL
Qty: 60 TABLET | Refills: 5 | Status: SHIPPED | OUTPATIENT
Start: 2019-01-01 | End: 2019-01-01 | Stop reason: SDUPTHER

## 2019-01-01 RX ORDER — POLYETHYLENE GLYCOL 3350 17 G/17G
17 POWDER, FOR SOLUTION ORAL DAILY PRN
Status: DISCONTINUED | OUTPATIENT
Start: 2019-01-01 | End: 2019-01-01 | Stop reason: HOSPADM

## 2019-01-01 RX ORDER — DOCUSATE SODIUM 100 MG/1
100 CAPSULE, LIQUID FILLED ORAL 2 TIMES DAILY
Status: DISCONTINUED | OUTPATIENT
Start: 2019-01-01 | End: 2019-01-01 | Stop reason: HOSPADM

## 2019-01-01 RX ORDER — ACETAMINOPHEN 325 MG/1
650 TABLET ORAL ONCE
Status: COMPLETED | OUTPATIENT
Start: 2019-01-01 | End: 2019-01-01

## 2019-01-01 RX ORDER — LIDOCAINE 50 MG/G
1 PATCH TOPICAL DAILY
Status: COMPLETED | OUTPATIENT
Start: 2019-01-01 | End: 2019-01-01

## 2019-01-01 RX ORDER — ATORVASTATIN CALCIUM 20 MG/1
20 TABLET, FILM COATED ORAL DAILY
Status: DISCONTINUED | OUTPATIENT
Start: 2019-01-01 | End: 2019-01-01 | Stop reason: HOSPADM

## 2019-01-01 RX ORDER — WARFARIN SODIUM 1 MG/1
TABLET ORAL
Qty: 60 TABLET | Refills: 0 | Status: CANCELLED | OUTPATIENT
Start: 2019-01-01

## 2019-01-01 RX ORDER — DOCUSATE SODIUM 100 MG/1
100 CAPSULE, LIQUID FILLED ORAL 2 TIMES DAILY
Qty: 10 CAPSULE | Refills: 0 | Status: SHIPPED | OUTPATIENT
Start: 2019-01-01

## 2019-01-01 RX ORDER — LORAZEPAM 1 MG/1
1 TABLET ORAL EVERY 12 HOURS PRN
Qty: 60 TABLET | Refills: 0 | OUTPATIENT
Start: 2019-01-01

## 2019-01-01 RX ADMIN — SALMETEROL XINAFOATE 1 PUFF: 50 POWDER, METERED ORAL; RESPIRATORY (INHALATION) at 21:21

## 2019-01-01 RX ADMIN — ATORVASTATIN CALCIUM 20 MG: 20 TABLET, FILM COATED ORAL at 08:06

## 2019-01-01 RX ADMIN — SALMETEROL XINAFOATE 1 PUFF: 50 POWDER, METERED ORAL; RESPIRATORY (INHALATION) at 20:45

## 2019-01-01 RX ADMIN — LORAZEPAM 1 MG: 1 TABLET ORAL at 22:10

## 2019-01-01 RX ADMIN — ALBUTEROL SULFATE 2.5 MG: 2.5 SOLUTION RESPIRATORY (INHALATION) at 08:29

## 2019-01-01 RX ADMIN — ACETAMINOPHEN 650 MG: 325 TABLET ORAL at 09:54

## 2019-01-01 RX ADMIN — ALBUTEROL SULFATE 2.5 MG: 2.5 SOLUTION RESPIRATORY (INHALATION) at 08:25

## 2019-01-01 RX ADMIN — SENNOSIDES AND DOCUSATE SODIUM 1 TABLET: 8.6; 5 TABLET ORAL at 21:20

## 2019-01-01 RX ADMIN — ATORVASTATIN CALCIUM 20 MG: 20 TABLET, FILM COATED ORAL at 08:04

## 2019-01-01 RX ADMIN — DOCUSATE SODIUM 100 MG: 100 CAPSULE, LIQUID FILLED ORAL at 17:14

## 2019-01-01 RX ADMIN — ALBUTEROL SULFATE 2 PUFF: 90 AEROSOL, METERED RESPIRATORY (INHALATION) at 10:50

## 2019-01-01 RX ADMIN — ALBUTEROL SULFATE 2 PUFF: 90 AEROSOL, METERED RESPIRATORY (INHALATION) at 07:13

## 2019-01-01 RX ADMIN — OXYCODONE HYDROCHLORIDE 5 MG: 5 TABLET ORAL at 17:18

## 2019-01-01 RX ADMIN — WARFARIN SODIUM 3 MG: 3 TABLET ORAL at 17:09

## 2019-01-01 RX ADMIN — ACETAMINOPHEN 650 MG: 325 TABLET ORAL at 20:07

## 2019-01-01 RX ADMIN — ALBUTEROL SULFATE 2.5 MG: 2.5 SOLUTION RESPIRATORY (INHALATION) at 19:45

## 2019-01-01 RX ADMIN — ALBUTEROL SULFATE 2 PUFF: 90 AEROSOL, METERED RESPIRATORY (INHALATION) at 17:17

## 2019-01-01 RX ADMIN — ALBUTEROL SULFATE 2.5 MG: 2.5 SOLUTION RESPIRATORY (INHALATION) at 18:51

## 2019-01-01 RX ADMIN — LORAZEPAM 1 MG: 1 TABLET ORAL at 21:26

## 2019-01-01 RX ADMIN — OXYCODONE HYDROCHLORIDE 2.5 MG: 5 TABLET ORAL at 08:53

## 2019-01-01 RX ADMIN — POTASSIUM CHLORIDE 40 MEQ: 20 SOLUTION ORAL at 17:17

## 2019-01-01 RX ADMIN — SALMETEROL XINAFOATE 1 PUFF: 50 POWDER, METERED ORAL; RESPIRATORY (INHALATION) at 08:07

## 2019-01-01 RX ADMIN — SALMETEROL XINAFOATE 1 PUFF: 50 POWDER, METERED ORAL; RESPIRATORY (INHALATION) at 08:05

## 2019-01-01 RX ADMIN — ALBUTEROL SULFATE 2.5 MG: 2.5 SOLUTION RESPIRATORY (INHALATION) at 07:21

## 2019-01-01 RX ADMIN — WARFARIN SODIUM 4 MG: 4 TABLET ORAL at 17:17

## 2019-01-01 RX ADMIN — DOCUSATE SODIUM 100 MG: 100 CAPSULE, LIQUID FILLED ORAL at 09:14

## 2019-01-01 RX ADMIN — ALBUTEROL SULFATE 2 PUFF: 90 AEROSOL, METERED RESPIRATORY (INHALATION) at 15:37

## 2019-01-01 RX ADMIN — OXYCODONE HYDROCHLORIDE 5 MG: 5 TABLET ORAL at 22:25

## 2019-01-01 RX ADMIN — LIDOCAINE 1 PATCH: 50 PATCH TOPICAL at 00:00

## 2019-01-01 RX ADMIN — DOCUSATE SODIUM 100 MG: 100 CAPSULE, LIQUID FILLED ORAL at 17:02

## 2019-01-01 RX ADMIN — SALMETEROL XINAFOATE 1 PUFF: 50 POWDER, METERED ORAL; RESPIRATORY (INHALATION) at 08:52

## 2019-01-01 RX ADMIN — OXYCODONE HYDROCHLORIDE 2.5 MG: 5 TABLET ORAL at 20:43

## 2019-01-01 RX ADMIN — ACETAMINOPHEN 650 MG: 325 TABLET ORAL at 08:55

## 2019-01-01 RX ADMIN — OXYCODONE HYDROCHLORIDE 5 MG: 5 TABLET ORAL at 09:21

## 2019-01-01 RX ADMIN — ALBUTEROL SULFATE 2.5 MG: 2.5 SOLUTION RESPIRATORY (INHALATION) at 19:55

## 2019-01-01 RX ADMIN — DOCUSATE SODIUM 100 MG: 100 CAPSULE, LIQUID FILLED ORAL at 08:06

## 2019-01-01 RX ADMIN — LISINOPRIL 10 MG: 10 TABLET ORAL at 08:06

## 2019-01-01 RX ADMIN — OXYCODONE HYDROCHLORIDE 2.5 MG: 5 TABLET ORAL at 03:43

## 2019-01-01 RX ADMIN — DOCUSATE SODIUM 100 MG: 100 CAPSULE, LIQUID FILLED ORAL at 08:04

## 2019-01-01 RX ADMIN — ATORVASTATIN CALCIUM 20 MG: 20 TABLET, FILM COATED ORAL at 08:52

## 2019-01-01 RX ADMIN — OXYCODONE HYDROCHLORIDE 2.5 MG: 5 TABLET ORAL at 06:29

## 2019-01-01 RX ADMIN — TRAMADOL HYDROCHLORIDE 50 MG: 50 TABLET, FILM COATED ORAL at 20:07

## 2019-01-01 RX ADMIN — LORAZEPAM 1 MG: 1 TABLET ORAL at 23:39

## 2019-01-01 RX ADMIN — DOCUSATE SODIUM 100 MG: 100 CAPSULE, LIQUID FILLED ORAL at 17:18

## 2019-01-01 RX ADMIN — LISINOPRIL 10 MG: 10 TABLET ORAL at 08:04

## 2019-01-01 RX ADMIN — IOHEXOL 85 ML: 350 INJECTION, SOLUTION INTRAVENOUS at 20:51

## 2019-01-01 RX ADMIN — SENNOSIDES AND DOCUSATE SODIUM 1 TABLET: 8.6; 5 TABLET ORAL at 22:17

## 2019-01-01 RX ADMIN — ALBUTEROL SULFATE 2.5 MG: 2.5 SOLUTION RESPIRATORY (INHALATION) at 07:12

## 2019-01-01 RX ADMIN — ATORVASTATIN CALCIUM 20 MG: 20 TABLET, FILM COATED ORAL at 09:14

## 2019-01-01 RX ADMIN — DOCUSATE SODIUM 100 MG: 100 CAPSULE, LIQUID FILLED ORAL at 08:52

## 2019-01-01 RX ADMIN — ALBUTEROL SULFATE 2.5 MG: 2.5 SOLUTION RESPIRATORY (INHALATION) at 19:31

## 2019-01-01 RX ADMIN — OXYCODONE HYDROCHLORIDE 5 MG: 5 TABLET ORAL at 08:06

## 2019-01-01 RX ADMIN — OXYCODONE HYDROCHLORIDE 2.5 MG: 5 TABLET ORAL at 22:18

## 2019-01-01 RX ADMIN — SALMETEROL XINAFOATE 1 PUFF: 50 POWDER, METERED ORAL; RESPIRATORY (INHALATION) at 09:13

## 2019-01-01 RX ADMIN — SALMETEROL XINAFOATE 1 PUFF: 50 POWDER, METERED ORAL; RESPIRATORY (INHALATION) at 20:06

## 2019-01-01 RX ADMIN — LISINOPRIL 10 MG: 10 TABLET ORAL at 08:52

## 2019-01-01 RX ADMIN — WARFARIN SODIUM 2 MG: 2 TABLET ORAL at 17:02

## 2019-01-01 RX ADMIN — WARFARIN SODIUM 3 MG: 3 TABLET ORAL at 17:14

## 2019-01-25 DIAGNOSIS — F41.9 ANXIETY: ICD-10-CM

## 2019-01-26 RX ORDER — LORAZEPAM 1 MG/1
TABLET ORAL
Qty: 60 TABLET | Refills: 0 | Status: SHIPPED | OUTPATIENT
Start: 2019-01-26 | End: 2019-01-01 | Stop reason: SDUPTHER

## 2019-01-27 DIAGNOSIS — F41.9 ANXIETY: ICD-10-CM

## 2019-01-28 RX ORDER — LORAZEPAM 1 MG/1
TABLET ORAL
Qty: 60 TABLET | Refills: 0 | OUTPATIENT
Start: 2019-01-28

## 2019-02-21 NOTE — PROGRESS NOTES
Assessment/Plan:  Patient is a 80year old femal sen for chronic medical conditions  She is accompanied by her son  She has had increased difficulties ambulating and her son asked about a scooter  I did tell him that I have filled paper work out for these in the past for other patients  I cannot say whether Medicare would cover any of this  Weight is steady at 112  She continues on coumadin for Factor V  She complains of cough and appears to have bronchitis  I did start an antibiotic in light of her age and COPD  Diagnoses and all orders for this visit:    Factor V Leiden ([address] )  -     warfarin (COUMADIN) 1 mg tablet; Take one tab on M/W/F and two tabs all other days  -     CBC and differential; Future  -     Comprehensive metabolic panel; Future  -     TSH, 3rd generation with Free T4 reflex; Future  -     Protime-INR; Future    Chronic obstructive pulmonary disease, unspecified COPD type (HCC)  -     cefuroxime (CEFTIN) 500 mg tablet; Take 1 tablet (500 mg total) by mouth every 12 (twelve) hours for 10 days    Mild persistent asthma without complication  -     Respiratory Therapy Supplies (NEBULIZER COMPRESSOR) KIT; by Does not apply route 2 (two) times a day  -     albuterol (2 5 mg/3 mL) 0 083 % nebulizer solution; Take 1 vial (2 5 mg total) by nebulization 2 (two) times a day  -     cefuroxime (CEFTIN) 500 mg tablet; Take 1 tablet (500 mg total) by mouth every 12 (twelve) hours for 10 days    Combined hyperlipidemia  -     Respiratory Therapy Supplies (NEBULIZER COMPRESSOR) KIT; by Does not apply route 2 (two) times a day  -     albuterol (2 5 mg/3 mL) 0 083 % nebulizer solution; Take 1 vial (2 5 mg total) by nebulization 2 (two) times a day  -     Lipid panel; Future  -     CBC and differential; Future  -     Comprehensive metabolic panel; Future  -     TSH, 3rd generation with Free T4 reflex;  Future    Ambulatory dysfunction  -     Motorized Scooter    Primary osteoarthritis of left hip  - Comprehensive metabolic panel; Future  -     Motorized Scooter    Bronchitis  -     CBC and differential; Future  -     Comprehensive metabolic panel; Future  -     cefuroxime (CEFTIN) 500 mg tablet; Take 1 tablet (500 mg total) by mouth every 12 (twelve) hours for 10 days    Need for shingles vaccine  -     Zoster Vac Recomb Adjuvanted (SHINGRIX) 50 MCG/0 5ML SUSR; Inject 0 5 mL into a muscle once for 1 dose Repeat in two to six months  Other orders  -     Cancel: Zoster Vaccine Recombinant IM          Subjective:   Chief Complaint   Patient presents with    Follow-up     patient presents for 4 month follow up to chronic conditions  patient also have quetions regarding a nebulizer machine,patient also have some concerned regarding been stable when walking, she wanted to know if she will be a good candidate for a scooter  patient also wants RX for new shingle vaccine (shingrix)     chest congestion     patient is also c/o chest congestion for 2 weeks with a  ocasssional cough  Patient ID: Nadja Jackson is a 80 y o  female  Patient is here for follow up of chronic medical conditions  The following portions of the patient's history were reviewed and updated as appropriate: allergies, current medications, past family history, past medical history, past social history, past surgical history and problem list     Review of Systems   Constitutional: Negative for chills and fever  HENT: Positive for congestion  Negative for sore throat  Respiratory: Positive for wheezing  Negative for chest tightness  Cardiovascular: Negative for chest pain and palpitations  Gastrointestinal: Negative for abdominal pain, constipation, diarrhea and nausea  Genitourinary: Negative for difficulty urinating  Skin: Negative  Neurological: Negative for dizziness and headaches  Psychiatric/Behavioral: Negative            Objective:      /84 (BP Location: Left arm, Patient Position: Sitting, Cuff Size: Standard)   Pulse 100   Temp 98 °F (36 7 °C) (Tympanic)   Resp 20   Wt 50 8 kg (112 lb 1 6 oz)   LMP  (LMP Unknown)   BMI 20 50 kg/m²          Physical Exam   Constitutional: She is oriented to person, place, and time  No distress  Frail appearance  Neck: Carotid bruit is not present  No thyromegaly present  Cardiovascular: Normal rate, regular rhythm and normal heart sounds  Stasis changes of legs  Pulmonary/Chest: Effort normal and breath sounds normal    Musculoskeletal: She exhibits no edema  Lymphadenopathy:     She has no cervical adenopathy  Neurological: She is alert and oriented to person, place, and time  Skin: Skin is warm and dry  Skin on legs is very papery thin  Psychiatric: She has a normal mood and affect  Nursing note and vitals reviewed

## 2019-03-21 NOTE — TELEPHONE ENCOUNTER
Patient was checking on her labs could you please give her a call  Her son said they were in my chart so she was wondering if you had seen it

## 2019-03-22 NOTE — TELEPHONE ENCOUNTER
I spoke to son  All of Alex Valdez is blood work was good except her INR is a little too low  So I am increasing her dosage to two 1 mg tablets every day  She needs to have a repeat INR in 1 month

## 2019-07-02 NOTE — TELEPHONE ENCOUNTER
Call patient - I had a request to refill her cholesterol medicine and it made me realize that she has not gone for an INR for almost two month

## 2019-07-03 NOTE — TELEPHONE ENCOUNTER
Called Pt and she states she was taking her coumadin twice a day and you increased it to three times a day and advised she wait a few weeks to re check  She thinks she may have lost track of how many weeks Pt states she will have her INR checked next week

## 2019-07-03 NOTE — TELEPHONE ENCOUNTER
Please put inr orders in [facility] network labs at [facility]  Fax to [phone]

## 2019-08-06 NOTE — TELEPHONE ENCOUNTER
Call patient  I authorized refill of her medications but she is due for an appointment this month  I saw her last in February

## 2019-08-07 NOTE — TELEPHONE ENCOUNTER
Pt notified of message details  Pt states she will call back to schedule  She has to get in touch with her Son who is her transportation to see what days he is available to bring her

## 2019-09-05 PROBLEM — I87.8 VENOUS STASIS: Status: ACTIVE | Noted: 2019-01-01

## 2019-09-05 NOTE — PROGRESS NOTES
Assessment/Plan:  Patient is a 80 year old female seen for follow up of chronic medical conditions  1  Factor V Leiden [facility])    Patient continues to take Coumadin  I stressed importance of going monthly for her INR  - warfarin (COUMADIN) 1 mg tablet; Take 3 tabs daily  Dispense: 90 tablet; Refill: 2    2  Anxiety    We checked the PDMP before prescribing medication   - LORazepam (ATIVAN) 1 mg tablet; Take 1 tablet (1 mg total) by mouth every 12 (twelve) hours as needed for anxiety  Dispense: 60 tablet; Refill: 0    3  Chronic obstructive pulmonary disease, unspecified COPD type ([facility] )  She does not use Flovent or Serevent regularly  She uses ventolin once a day  We discussed that if she needs to use more than once a day, then we jamila to restart flovent and Serevent  - albuterol (VENTOLIN HFA) 90 mcg/act inhaler; Inhale 2 puffs daily at bedtime  Dispense: 1 Inhaler; Refill: 5    She eats lunch and dinner and a snack  She gets up late in the morning  Hasn't had dizzy spells lately  Diagnoses and all orders for this visit:    Pain in both feet  -     Ambulatory referral to Podiatry; Future    Factor V Leiden ([facility] )  -     warfarin (COUMADIN) 1 mg tablet; Take 3 tabs daily    Anxiety  -     LORazepam (ATIVAN) 1 mg tablet; Take 1 tablet (1 mg total) by mouth every 12 (twelve) hours as needed for anxiety    Chronic obstructive pulmonary disease, unspecified COPD type ([facility])  -     albuterol (VENTOLIN HFA) 90 mcg/act inhaler; Inhale 2 puffs daily at bedtime    Other chronic pain  -     traMADol (ULTRAM) 50 mg tablet; Take one daily as needed for pain    Venous stasis          Subjective:   Chief Complaint   Patient presents with    Follow-up     Pt presents for a 6 month f/u  Patient ID: Donovan Cleary is a 80 y o  female  Patient is here for follow up of chronic medical conditions  Patient is compliant with medications although not always compliant in going for blood work      She is accompanied by her son  The following portions of the patient's history were reviewed and updated as appropriate: allergies, current medications, past family history, past medical history, past social history, past surgical history and problem list     Review of Systems   Constitutional: Positive for fatigue  Negative for chills and fever  HENT: Negative for congestion and sore throat  Respiratory: Negative for chest tightness  Cardiovascular: Negative for chest pain and palpitations  Gastrointestinal: Negative for abdominal pain, constipation, diarrhea and nausea  Genitourinary: Negative for difficulty urinating  Musculoskeletal: Positive for arthralgias and gait problem  Skin: Negative  Neurological: Negative for dizziness and headaches  Psychiatric/Behavioral: Negative  Objective:      /68 (BP Location: Right arm, Patient Position: Sitting, Cuff Size: Standard)   Pulse 103   Temp 98 2 °F (36 8 °C) (Tympanic)   Resp 15   Ht 5' 2" (1 575 m)   Wt 51 5 kg (113 lb 9 6 oz)   LMP  (LMP Unknown)   SpO2 94%   BMI 20 78 kg/m²          Physical Exam   Constitutional: She is oriented to person, place, and time  She appears ill  No distress  Neck: Carotid bruit is not present  No thyromegaly present  Cardiovascular: Normal rate, regular rhythm and normal heart sounds  Pulmonary/Chest: Effort normal and breath sounds normal    Musculoskeletal: She exhibits no edema  Patient in wheelchair  Lymphadenopathy:     She has no cervical adenopathy  Neurological: She is alert and oriented to person, place, and time  Skin: Skin is warm and dry  Psychiatric: She has a normal mood and affect  Nursing note and vitals reviewed

## 2019-11-26 NOTE — TELEPHONE ENCOUNTER
Last tramadol fill: 10/31/19 #30   Last Lorazepam fill: 10/31/19 #60  Last appt: 9/5/19  Next appt: 3/5/20

## 2019-11-28 PROBLEM — J45.20 MILD INTERMITTENT ASTHMA WITHOUT COMPLICATION: Chronic | Status: ACTIVE | Noted: 2018-03-14

## 2019-11-28 PROBLEM — E44.0 MODERATE PROTEIN-CALORIE MALNUTRITION (HCC): Chronic | Status: ACTIVE | Noted: 2019-01-01
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
## 2019-11-28 PROBLEM — R26.2 AMBULATORY DYSFUNCTION: Status: ACTIVE | Noted: 2019-01-01

## 2019-11-28 PROBLEM — N83.201 CYST OF RIGHT OVARY: Status: ACTIVE | Noted: 2019-01-01

## 2019-11-28 PROBLEM — B35.1 ONYCHOMYCOSIS: Status: ACTIVE | Noted: 2019-01-01

## 2019-11-28 PROBLEM — J96.01 ACUTE RESPIRATORY FAILURE WITH HYPOXIA (HCC): Status: ACTIVE | Noted: 2019-01-01

## 2019-11-28 PROBLEM — R65.10 SIRS (SYSTEMIC INFLAMMATORY RESPONSE SYNDROME) (HCC): Status: ACTIVE | Noted: 2019-01-01

## 2019-11-29 NOTE — PHYSICAL THERAPY NOTE
Physical Therapy Cancellation Note        PT orders received  Pt pending ortho consult secondary to R pubic bone fx  Will hold PT IE until cleared by ortho and pt appropriate for therapy       Jayne Eller, PT

## 2019-11-29 NOTE — ED PROVIDER NOTES
History  Chief Complaint   Patient presents with    Fall     patient fell last night around 2100  patient now c/o right sided hip pain  c/o difficulty moving  Pt is a 80year old female with a PMH of COPD and VTE presenting with fall  Pt states 2 days ago she had mechanical fall onto her right side  States she has bruising and pain in her right hip and back since  She has not been able to ambulate or stand up due to the fall  She has decreased ROM of the right hip  Denies head injury or LOC  Denies chest pain or SOB from her baseline  Denies abdominal pain or vomiting  Urinating normally, denies hematuria  Takes Coumadin daily  Pt lives with her son, able to ambulate normally with walker or cane  Prior to Admission Medications   Prescriptions Last Dose Informant Patient Reported? Taking?    FLOVENT  MCG/ACT inhaler   No No   Sig: INHALE TWO PUFFS BY MOUTH TWICE DAILY   LORazepam (ATIVAN) 1 mg tablet   No No   Sig: Take 1 tablet (1 mg total) by mouth every 12 (twelve) hours as needed for anxiety   Respiratory Therapy Supplies (NEBULIZER COMPRESSOR) KIT   No No   Sig: by Does not apply route 2 (two) times a day   SEREVENT DISKUS 50 MCG/DOSE diskus inhaler   Yes No   Si puff every 12 (twelve) hours   acetaminophen (TYLENOL) 325 mg tablet   No No   Sig: Take 2 tablets (650 mg total) by mouth every 6 (six) hours as needed for fever   albuterol (2 5 mg/3 mL) 0 083 % nebulizer solution   No No   Sig: Take 1 vial (2 5 mg total) by nebulization 2 (two) times a day   albuterol (VENTOLIN HFA) 90 mcg/act inhaler   No No   Sig: Inhale 2 puffs every 6 (six) hours as needed for wheezing   atorvastatin (LIPITOR) 20 mg tablet   No No   Sig: TAKE ONE TABLET BY MOUTH ONCE DAILY    lisinopril (ZESTRIL) 10 mg tablet   Yes No   Sig: Take 10 mg by mouth daily   traMADol (ULTRAM) 50 mg tablet   No No   Sig: TAKE ONE TABLET BY MOUTH DAILY AS NEEDED FOR PAIN   warfarin (COUMADIN) 1 mg tablet   No No   Sig: Take 3 tabs daily      Facility-Administered Medications: None       Past Medical History:   Diagnosis Date    Abnormal blood chemistry     last assessed: 09/20/14    Asthma     Breast cancer ([facility] ) 08/2008    COPD (chronic obstructive pulmonary disease) (HCC)     DVT [facility]deep venous thrombosis) ([facility])     Edema     last assessed: 06/25/15    Gait disturbance     last assessed: 05/14/14    Herpes zoster     last assessed: 11/20/12    Malignant neoplasm of breast (female) [facility])     last assessed: 10/31/12    Open wound of ankle     last assessed: 07/29/15    Pulmonary embolism ([facility] ) 07/2011       Past Surgical History:   Procedure Laterality Date    NO PAST SURGERIES         Family History   Problem Relation Age of Onset    Diabetes Mother     Other Daughter         Agoraphobia    Hypertension Daughter     Cervical cancer Family     Emphysema Family      I have reviewed and agree with the history as documented  Social History     Tobacco Use    Smoking status: Former Smoker    Smokeless tobacco: Never Used   Substance Use Topics    Alcohol use: No    Drug use: No        Review of Systems   Constitutional: Negative for chills, diaphoresis and fever  Respiratory: Negative for cough, chest tightness and shortness of breath  Cardiovascular: Negative for chest pain  Gastrointestinal: Negative for abdominal pain, constipation, diarrhea, nausea and vomiting  Genitourinary: Negative for decreased urine volume, difficulty urinating and hematuria  Musculoskeletal: Positive for arthralgias, back pain, gait problem and joint swelling  Negative for neck pain and neck stiffness  Skin: Positive for color change  Neurological: Negative for dizziness and light-headedness  Physical Exam  Physical Exam   Constitutional: She is oriented to person, place, and time  She appears well-developed and well-nourished  No distress  HENT:   Head: Normocephalic and atraumatic     Right Ear: External ear normal    Left Ear: External ear normal    Nose: Nose normal    Eyes: Conjunctivae and EOM are normal    Neck: Normal range of motion  Neck supple  Cardiovascular: Normal rate, regular rhythm, normal heart sounds and intact distal pulses  Pulses:       Dorsalis pedis pulses are 2+ on the right side, and 2+ on the left side  Pulmonary/Chest: Effort normal and breath sounds normal    Abdominal: Soft  Bowel sounds are normal  She exhibits no distension  There is no tenderness  Musculoskeletal:        Right hip: She exhibits decreased range of motion and decreased strength  She exhibits no tenderness, no bony tenderness, no swelling, no crepitus, no deformity and no laceration  Right knee: Normal         Lumbar back: She exhibits decreased range of motion, tenderness and pain  She exhibits no bony tenderness, no swelling, no edema, no deformity, no laceration, no spasm and normal pulse  Right upper leg: She exhibits tenderness  She exhibits no bony tenderness, no swelling, no edema, no deformity and no laceration  Legs:  Neurological: She is alert and oriented to person, place, and time  No sensory deficit  Skin: Skin is warm and dry  She is not diaphoretic         Vital Signs  ED Triage Vitals   Temperature Pulse Respirations Blood Pressure SpO2   11/28/19 1859 11/28/19 1859 11/28/19 1859 11/28/19 1859 11/28/19 1859   99 1 °F (37 3 °C) (!) 110 18 (!) 178/87 93 %      Temp src Heart Rate Source Patient Position - Orthostatic VS BP Location FiO2 (%)   -- 11/28/19 1958 11/28/19 1859 11/28/19 1859 --    Monitor Lying Right arm       Pain Score       11/28/19 1859       Worst Possible Pain           Vitals:    11/28/19 1859 11/28/19 1958 11/28/19 2057   BP: (!) 178/87  159/69   Pulse: (!) 110 93 90   Patient Position - Orthostatic VS: Lying  Lying         Visual Acuity      ED Medications  Medications   traMADol (ULTRAM) tablet 50 mg (50 mg Oral Given 11/28/19 2007)   acetaminophen (TYLENOL) tablet 650 mg (650 mg Oral Given 11/28/19 2007)   iohexol (OMNIPAQUE) 350 MG/ML injection (MULTI-DOSE) 85 mL (85 mL Intravenous Given 11/28/19 2051)       Diagnostic Studies  Results Reviewed     Procedure Component Value Units Date/Time    POCT urinalysis dipstick [[id number]]     Lab Status:  No result Specimen:  Urine     Protime-INR [[id number]]  (Abnormal) Collected:  11/28/19 1940    Lab Status:  Final result Specimen:  Blood from Arm, Left Updated:  11/28/19 2008     Protime 24 0 seconds      INR 2 10    APTT [[id number]]  (Abnormal) Collected:  11/28/19 1940    Lab Status:  Final result Specimen:  Blood from Arm, Left Updated:  11/28/19 2008     PTT 38 seconds     Basic metabolic panel [[id number]] Collected:  11/28/19 1940    Lab Status:  Final result Specimen:  Blood from Arm, Left Updated:  11/28/19 2000     Sodium 139 mmol/L      Potassium 3 7 mmol/L      Chloride 103 mmol/L      CO2 26 mmol/L      ANION GAP 10 mmol/L      BUN 23 mg/dL      Creatinine 0 79 mg/dL      Glucose 119 mg/dL      Calcium 8 8 mg/dL      eGFR 64 ml/min/1 73sq m     Narrative:       [place] guidelines for Chronic Kidney Disease (CKD):     Stage 1 with normal or high GFR (GFR > 90 mL/min/1 73 square meters)    Stage 2 Mild CKD (GFR = 60-89 mL/min/1 73 square meters)    Stage 3A Moderate CKD (GFR = 45-59 mL/min/1 73 square meters)    Stage 3B Moderate CKD (GFR = 30-44 mL/min/1 73 square meters)    Stage 4 Severe CKD (GFR = 15-29 mL/min/1 73 square meters)    Stage 5 End Stage CKD (GFR <15 mL/min/1 73 square meters)  Note: GFR calculation is accurate only with a steady state creatinine    CBC and differential [[id number]]  (Abnormal) Collected:  11/28/19 1940    Lab Status:  Final result Specimen:  Blood from Arm, Left Updated:  11/28/19 1950     WBC 13 32 Thousand/uL      RBC 3 89 Million/uL      Hemoglobin 12 5 g/dL      Hematocrit 39 1 %       fL      MCH 32 1 pg      MCHC 32 0 g/dL      RDW 14 3 %      MPV 10 0 fL      Platelets 027 Thousands/uL      nRBC 0 /100 WBCs      Neutrophils Relative 80 %      Immat GRANS % 1 %      Lymphocytes Relative 8 %      Monocytes Relative 10 %      Eosinophils Relative 1 %      Basophils Relative 0 %      Neutrophils Absolute 10 67 Thousands/µL      Immature Grans Absolute 0 08 Thousand/uL      Lymphocytes Absolute 1 12 Thousands/µL      Monocytes Absolute 1 32 Thousand/µL      Eosinophils Absolute 0 11 Thousand/µL      Basophils Absolute 0 02 Thousands/µL                  CT abdomen pelvis with contrast   Final Result by Gabriela Mae MD (11/28 2139)      1  No acute findings in the abdomen or pelvis  2   Incidental right adnexal unilocular cystic mass measuring 4 8 x 5 3 x 5 7 cm  Consider further evaluation with pelvic ultrasound on a nonemergent basis  3   Colonic diverticulosis without evidence of acute diverticulitis  The study was marked in EPIC for significant notification  Workstation performed: [id number]         XR hip/pelv 2-3 vws right    (Results Pending)   XR chest 1 view    (Results Pending)              Procedures  Procedures       ED Course  ED Course as of Nov 28 2212   Thu Nov 28, 2019 2156 Traumatic fall without fracture of the hip or back  No hematoma or bleeding noted  Incidental findings that can be addressed in the future  She is unable to ambulate at this time, will admit to [facility]  Pain is controlled at this time  MDM    Disposition  Final diagnoses:   Fall, initial encounter   Back pain   Ambulatory dysfunction     Time reflects when diagnosis was documented in both MDM as applicable and the Disposition within this note     Time User Action Codes Description Comment    11/28/2019  9:55 PM Monie Lin Add [F29  UZUL] Fall, initial encounter     11/28/2019  9:55 PM Dannielle CONNELLY Add [M54 9] Back pain     11/28/2019  9:55 PM Dannielle CONNELLY Add [R26 2] Ambulatory dysfunction       ED Disposition ED Disposition Condition Date/Time Comment    Admit Stable Thu Nov 28, 2019 10:11 PM Case was discussed with [facility]A and the patient's admission status was agreed to be Admission Status: inpatient status to the service of Dr Derick Thakkar   Follow-up Information    None         Patient's Medications   Discharge Prescriptions    No medications on file     No discharge procedures on file      ED Provider  Electronically Signed by           Sherrell Gonzales PA-C  11/28/19 0004

## 2019-11-29 NOTE — H&P
H&P- Waynette Reasons 6/24/1925, 80 y o  female MRN: [id number]    Unit/Bed#: E5 -01 Encounter: [id number]    Primary Care Provider: Naz Fall DO   Date and time admitted to hospital: 11/28/2019  6:57 PM      * Fall at home, initial encounter  [address] [place] [address]  S/p Fall; on warfarin, denies hitting her head  C/o hip pain and inability to ambulate   XR Hip pending  CT abd/pelvis:  Negative for acute findings  PT OT consult  OOB with assist    SIRS (systemic inflammatory response syndrome) (HCC)  Assessment & Plan  Meets SIRS criteria with tachycardia and leukocytosis; likely stress reactive  Monitor for fever  Monitor WBC    Acute respiratory failure with hypoxia (HCC)  Assessment & Plan  H/o COPD and asthma, not on home O2  Denies SOB  No acute exacerbation  O2 sat 88%, improved to 96% with NC  CXR negative for acute cardiopulmonary disease  CT:  Patchy ill-defined opacities in the right lower lobe with somewhat linear configuration favoring scarring/atelectasis  Triangular-shaped opacity in this region measuring 10 mm, presumed to be related to scarring  · Incentive spirometer   · Monitor O2 saturation    Ambulatory dysfunction  Assessment & Plan  Difficulty ambulating and bearing weight after fall  PT OT consult  Case Mgmt consult for possible post acute rehab placement vs Home PT    Factor V Leiden [facility])  Assessment & Plan  Maintained on warfarin 3mg qd, continue  INR 2 1   Monitor INR    Moderate protein-calorie malnutrition (HCC)  Assessment & Plan  Malnutrition Findings: BMI Findings: Body mass index is 21 77 kg/m²  · Nutrition consult    Onychomycosis  Assessment & Plan  Elongated toenails  Podiatry consult    Essential hypertension  Assessment & Plan  Remotely on lisinopril, was discontinued by PMD due to normal BP readings  Monitor BP    Anxiety  Assessment & Plan  Maintained on p r n  Lorazepam 1mg BID PRN, continue   Verified on PDMP    Cyst of right ovary  Assessment & Plan  Incidental finding on CT: Incidental Rt adnexal unilocular cystic mass measuring 4 8 x 5 3 x 5 7 cm  Consider further evaluation with pelvic ultrasound on a nonemergent basis    COPD (chronic obstructive pulmonary disease) ([facility] )  Assessment & Plan  No acute exacerbation, not maintained on home O2  Pt stopped maintenance inhalers due to side effects; continue albuterol neb b i d  and p r n  Albuterol HFA      VTE Prophylaxis: Warfarin (Coumadin)  / reason for no mechanical VTE prophylaxis ac   Code Status: FC  POLST: POLST is not applicable to this patient  Discussion with family:  Son at bedside    Anticipated Length of Stay:  Patient will be admitted on an Inpatient basis with an anticipated length of stay of  > 2 midnights  Justification for Hospital Stay:  Fall, ambulatory dysfunction    Total Time for Visit, including Counseling / Coordination of Care: 1 hour  Greater than 50% of this total time spent on direct patient counseling and coordination of care  Chief Complaint:   Hope Spindle, can't walk    History of Present Illness:    Hayden Joy is a 80 y o  female who presents with c/o fall at home Tues night, states she was standing by her bed and her leg gave out  She fell landing on her right side, denies loss of consciousness, did not hit her head  Since the fall her mobility has declined due to R hip pain  States she always had issues with R hip pain, declined hip replacement in the past  Ambulatory, uses cane for short distances, Rollator for long distances  Is requiring assistance getting OOB due to pain  Reports cough since last month, improving, unproductive  H/o COPD and asthma, reports chronic intermittent episodes of shortness of breath, worse with exertion  Denies fever or chills, chest pain, abdominal pain, NVCD, melena, hematuria or dysuria  Review of Systems:    Review of Systems   Constitutional: Negative  HENT: Negative  Respiratory: Negative  Cardiovascular: Negative  Gastrointestinal: Negative  Genitourinary: Negative  Musculoskeletal: Positive for arthralgias, back pain and gait problem  Neurological: Positive for weakness  Negative for dizziness, light-headedness and headaches  Past Medical and Surgical History:     Past Medical History:   Diagnosis Date    Abnormal blood chemistry     last assessed: 09/20/14    Asthma     Breast cancer ([facility] ) 08/2008    [facility]     DVT (deep venous thrombosis) ([facility]     Edema     last assessed: 06/25/15    Gait disturbance     last assessed: 05/14/14    Herpes zoster     last assessed: 11/20/12    Malignant neoplasm of breast (female) [facility])     last assessed: 10/31/12    Open wound of ankle     last assessed: 07/29/15    Pulmonary embolism ([facility] ) 07/2011       Past Surgical History:   Procedure Laterality Date    NO PAST SURGERIES         Meds/Allergies:    Prior to Admission medications    Medication Sig Start Date End Date Taking?  Authorizing Provider   acetaminophen (TYLENOL) 325 mg tablet Take 2 tablets (650 mg total) by mouth every 6 (six) hours as needed for fever 3/15/18  Yes Lorraine Nelson PA-C   albuterol (2 5 mg/3 mL) 0 083 % nebulizer solution Take 1 vial (2 5 mg total) by nebulization 2 (two) times a day 2/21/19  Yes Leeroy Parker, DO   albuterol (VENTOLIN HFA) 90 mcg/act inhaler Inhale 2 puffs every 6 (six) hours as needed for wheezing 10/24/19  Yes Leeroy Anthonyast, DO   apixaban (ELIQUIS) 2 5 mg Take 2 5 mg by mouth 2 (two) times a day   Yes Historical Provider, MD   atorvastatin (LIPITOR) 20 mg tablet TAKE ONE TABLET BY MOUTH ONCE DAILY  7/2/19  Yes Leeroy Anthonyast, DO   FLOVENT  MCG/ACT inhaler INHALE TWO PUFFS BY MOUTH TWICE DAILY 8/20/18  Yes Paxton Lo, DO   lisinopril (ZESTRIL) 10 mg tablet Take 10 mg by mouth daily 8/3/18  Yes Historical Provider, MD   LORazepam (ATIVAN) 1 mg tablet Take 1 tablet (1 mg total) by mouth every 12 (twelve) hours as needed for anxiety 11/27/19  Yes Tata Barriga DO   SEREVENT DISKUS 50 MCG/DOSE diskus inhaler 1 puff every 12 (twelve) hours 2/20/18  Yes Historical Provider, MD   traMADol (ULTRAM) 50 mg tablet TAKE ONE TABLET BY MOUTH DAILY AS NEEDED FOR PAIN 11/27/19  Yes Geovany Marte DO   warfarin (COUMADIN) 1 mg tablet Take 3 tabs daily  Patient taking differently: Take 1 mg by mouth One tab on M/W/F and two tab all other days  9/5/19  Yes Geovany Marte DO   Respiratory Therapy Supplies (NEBULIZER COMPRESSOR) KIT by Does not apply route 2 (two) times a day 2/21/19   Geovany Marte DO     I have reviewed home medications with patient personally  Allergies: Allergies   Allergen Reactions    Dog Epithelium     Cat Hair Extract Sneezing    Ciprofloxacin Hives    Erythromycin Nausea Only    Sulfa Antibiotics Edema       Social History:     Marital Status:    Occupation: retired  Patient Pre-hospital Living Situation:  Resides at home with daughter  Patient Pre-hospital Level of Mobility:  Cane, Rollator long distances  Patient Pre-hospital Diet Restrictions:   Substance Use History:   Social History     Substance and Sexual Activity   Alcohol Use No     Social History     Tobacco Use   Smoking Status Former Smoker   Smokeless Tobacco Never Used     Social History     Substance and Sexual Activity   Drug Use No       Family History:    Family History   Problem Relation Age of Onset    Diabetes Mother     Other Daughter         Agoraphobia    Hypertension Daughter     Cervical cancer Family     Emphysema Family        Physical Exam:     Vitals:   Blood Pressure: 159/69 (11/28/19 2057)  Pulse: 90 (11/28/19 2057)  Temperature: 99 1 °F (37 3 °C) (11/28/19 1859)  Respirations: 18 (11/28/19 2057)  Weight - Scale: 54 kg (119 lb 0 8 oz) (11/28/19 1859)  SpO2: 96 % (11/28/19 2057)    Physical Exam   Constitutional: She is oriented to person, place, and time  She appears well-developed  No distress     cachectic   HENT:   Head: Normocephalic and atraumatic  Eyes: No scleral icterus  Neck: Neck supple  Cardiovascular: Normal rate and regular rhythm  Murmur heard  Pulmonary/Chest: Effort normal  No stridor  No respiratory distress  She has no wheezes  She has no rales  Decreased breath sounds   Abdominal: Soft  Bowel sounds are normal  She exhibits no distension and no mass  There is no tenderness  There is no guarding  Musculoskeletal: She exhibits tenderness  She exhibits no edema  Hyperpigmentation B/L LE, RLE hemorrhagic blister   Neurological: She is alert and oriented to person, place, and time  Skin: Skin is warm and dry  She is not diaphoretic  No erythema  Mycotic toenails, elongated and thick   Psychiatric: She has a normal mood and affect  Her behavior is normal  Judgment and thought content normal        Additional Data:     Lab Results: I have personally reviewed pertinent reports  Results from last 7 days   Lab Units 11/28/19  1940   WBC Thousand/uL 13 32*   HEMOGLOBIN g/dL 12 5   HEMATOCRIT % 39 1   PLATELETS Thousands/uL 137*   NEUTROS PCT % 80*   LYMPHS PCT % 8*   MONOS PCT % 10   EOS PCT % 1     Results from last 7 days   Lab Units 11/28/19  1940   SODIUM mmol/L 139   POTASSIUM mmol/L 3 7   CHLORIDE mmol/L 103   CO2 mmol/L 26   BUN mg/dL 23   CREATININE mg/dL 0 79   ANION GAP mmol/L 10   CALCIUM mg/dL 8 8   GLUCOSE RANDOM mg/dL 119     Results from last 7 days   Lab Units 11/28/19  1940   INR  2 10*                   Imaging: I have personally reviewed pertinent reports  CT abdomen pelvis with contrast   Final Result by Sidney Virk MD (11/28 2139)      1  No acute findings in the abdomen or pelvis  2   Incidental right adnexal unilocular cystic mass measuring 4 8 x 5 3 x 5 7 cm  Consider further evaluation with pelvic ultrasound on a nonemergent basis  3   Colonic diverticulosis without evidence of acute diverticulitis  The study was marked in EPIC for significant notification  Workstation performed: [id number]         XR hip/pelv 2-3 vws right    (Results Pending)   XR chest 1 view    (Results Pending)       EKG, Pathology, and Other Studies Reviewed on Admission:   · XR CT    Allscripts / Epic Records Reviewed: Yes     ** Please Note: This note has been constructed using a voice recognition system   **

## 2019-11-29 NOTE — MALNUTRITION/BMI
This medical record reflects one or more clinical indicators suggestive of malnutrition and/or morbid obesity  Malnutrition Findings:   Malnutrition type: Chronic illness(R)  Degree of Malnutrition: Malnutrition of moderate degree(elated to eating less due to decreased mobility with advanced age as evidenced by mild-moderate muscle mass depletion seen in temples and clavicle  and pt meeting <75% of needs >1 mo)  Malnutrition Characteristics: Muscle loss, Inadequate energy(Pt 's appetite improved in hospital setting d/t providing prepared meals)    BMI Findings: Body mass index is 21 77 kg/m²  See Nutrition note dated 11/29/19 for additional details  Completed nutrition assessment is viewable in the nutrition documentation

## 2019-11-29 NOTE — ASSESSMENT & PLAN NOTE
S/p Fall; on warfarin, denies hitting her head   C/o hip pain and inability to ambulate   XR Hip pending  CT abd/pelvis:  Negative for acute findings  PT OT consult  OOB with assist

## 2019-11-29 NOTE — ASSESSMENT & PLAN NOTE
H/o COPD and asthma, not on home O2  Denies SOB  No acute exacerbation  O2 sat 88%, improved to 96% with NC  CXR negative for acute cardiopulmonary disease  CT:  Patchy ill-defined opacities in the right lower lobe with somewhat linear configuration favoring scarring/atelectasis    Triangular-shaped opacity in this region measuring 10 mm, presumed to be related to scarring  · Incentive spirometer   · Monitor O2 saturation

## 2019-11-29 NOTE — ASSESSMENT & PLAN NOTE
No acute exacerbation, not maintained on home O2  Pt stopped maintenance inhalers due to side effects; continue albuterol neb b i d  and p r n   Albuterol HFA

## 2019-11-29 NOTE — OCCUPATIONAL THERAPY NOTE
Occupational Therapy         Patient Name: Bryan Polk  [id number]'H Date: 11/29/2019    MD's orders received  Pt pending ortho consult 2* R pubic fx, per attending  Will continue when cleared by ortho   Brigida Ruiz OT

## 2019-11-29 NOTE — ASSESSMENT & PLAN NOTE
Meets SIRS criteria with tachycardia and leukocytosis; likely stress reactive  Monitor for fever  Monitor WBC

## 2019-11-29 NOTE — PROGRESS NOTES
[facility] Progress Note  Patient: Joanna Sarah 80 y o  female   MRN: [id number]  PCP: Amaris Koch DO  Unit/Bed#: E5 -80 Encounter: [id number]  Date Of Visit: 11/29/19    Assessment:    Principal Problem:    Fall at home, initial encounter  Active Problems:    COPD (chronic obstructive pulmonary disease) ([facility] )    Factor V Leiden ([place] [address] )    Anxiety    Essential hypertension    Ambulatory dysfunction    Acute respiratory failure with hypoxia ([email or web address] )    SIRS (systemic inflammatory response syndrome) ([place] [address] )    Cyst of right ovary    Onychomycosis    Moderate protein-calorie malnutrition ([facility] )      Plan:    # Fall (mechanical) w/ acute displaced fracture of the right inferior pubic bone, nondisplaced fracture of right superior pubic full  - likely nonoperable, but will consult Orthopedics  - continue p r n  pain analgesics  - PT/OT once confirmed weight-bearing status     # Ambulatory dysfunction  - pt/ot pending    # Onychomycosis/RT anterior tibia blood blister/wound s/p fall  - appreciate podiatry input  - s/p bedside debridement, no sign of acute infxn  - cont wound care    # Acute hypoxic resp failure   - unclear of etiology  - underlying hx of copd/asthma no sign of exacerbation    # Hx of factor V leiden  - INR therapeutic    # SIRs criteria, non infectious etiology  - monitor off abx    # Essential htn - resume back on resume    # Moderate protein-caloric malnutrition  - s/p nutrition consult    # RT adnexal cystic mass, incidental finding  - pelvic US as outpatient    VTE Pharmacologic Prophylaxis:   Pharmacologic: Warfarin (Coumadin)  Mechanical VTE Prophylaxis in Place: No    Patient Centered Rounds: I have performed bedside rounds with nursing staff today  Discussions with Specialists or Other Care Team Provider: Podiatry    Education and Discussions with Family / Patient: Patient    Time Spent for Care: 30 minutes    More than 50% of total time spent on counseling and coordination of care as described above  Current Length of Stay: 1 day(s)    Current Patient Status: Inpatient   Certification Statement: The patient will continue to require additional inpatient hospital stay due to pain control    Discharge Plan / Estimated Discharge Date:     Code Status: Level 1 - Full Code      Subjective:   Patient seen and examined  RLE pain   No acute events overnight    Objective:     Vitals:   Temp (24hrs), Av 3 °F (36 8 °C), Min:97 8 °F (36 6 °C), Max:99 1 °F (37 3 °C)    Temp:  [97 8 °F (36 6 °C)-99 1 °F (37 3 °C)] 98 5 °F (36 9 °C)  HR:  [] 100  Resp:  [16-18] 18  BP: (125-178)/(56-87) 125/56  SpO2:  [88 %-96 %] 94 %  Body mass index is 21 77 kg/m²  Input and Output Summary (last 24 hours): Intake/Output Summary (Last 24 hours) at 2019 1712  Last data filed at 2019 1200  Gross per 24 hour   Intake 420 ml   Output 300 ml   Net 120 ml       Physical Exam:     Physical Exam   Constitutional: She is oriented to person, place, and time  She appears well-developed and well-nourished  Neck: Neck supple  Cardiovascular: Normal rate, regular rhythm, normal heart sounds and intact distal pulses  Exam reveals no gallop and no friction rub  No murmur heard  Pulmonary/Chest: Effort normal and breath sounds normal  No stridor  No respiratory distress  She has no wheezes  She has no rales  She exhibits no tenderness  Abdominal: Soft  Bowel sounds are normal  She exhibits no distension  There is no tenderness  There is no rebound and no guarding  Musculoskeletal: She exhibits tenderness  She exhibits no edema or deformity  Limited ROM of RLE due to pain   Neurological: She is alert and oriented to person, place, and time  She displays normal reflexes  No cranial nerve deficit or sensory deficit  She exhibits normal muscle tone   Coordination normal    Skin:   Chronic skin changes in b/l LE secondary to venous stasis  + blood blister on rt anterior tibia     Additional Data:     Labs:    Results from last 7 days   Lab Units 11/29/19  0545   WBC Thousand/uL 11 12*   HEMOGLOBIN g/dL 11 6   HEMATOCRIT % 37 0   PLATELETS Thousands/uL 121*   NEUTROS PCT % 73   LYMPHS PCT % 10*   MONOS PCT % 13*   EOS PCT % 3     Results from last 7 days   Lab Units 11/29/19  0545   POTASSIUM mmol/L 3 8   CHLORIDE mmol/L 103   CO2 mmol/L 25   BUN mg/dL 22   CREATININE mg/dL 0 64   CALCIUM mg/dL 8 5     Results from last 7 days   Lab Units 11/29/19  0545   INR  2 08*       * I Have Reviewed All Lab Data Listed Above  * Additional Pertinent Lab Tests Reviewed: All Labs Within Last 24 Hours Reviewed    Imaging:    Imaging Reports Reviewed Today Include:   Imaging Personally Reviewed by Myself Includes:      Recent Cultures (last 7 days):           Last 24 Hours Medication List:     Current Facility-Administered Medications:  acetaminophen 650 mg [facility] Q6H PRN [facility]   albuterol 2 puff Inhalation Q6H PRN [facility], CRNP   albuterol 2 5 mg Nebulization BID [facility], CRNP   aluminum-magnesium hydroxide-simethicone 30 mL Oral Q6H PRN [facility] Pura, CRNP   atorvastatin 20 mg Oral Daily [facility] Pura, CRNP   benzonatate 100 mg Oral TID PRN [facility], CRNP   HYDROmorphone 0 2 mg Intravenous Q4H PRN [facility] Pura, CRNP   LORazepam 1 mg Oral Q12H PRN [facility] Pura, CRNP   ondansetron 4 mg Intravenous Q6H PRN [facility] Pura, CRNP   oxyCODONE 2 5 mg Oral Q4H PRN [facility] Pura, CRNP   warfarin 3 mg Oral Daily (warfarin) Lilly Neves, MOISES        Today, Patient Was Seen By: Cristy Smiley DO    ** Please Note: This note has been constructed using a voice recognition system   **

## 2019-11-29 NOTE — CONSULTS
Patient admits to eating less at home d/t decreased appetite  Pt  seems to be eating more in hospital likely d/t meals being prepared and served to her  Discussed importance of good nutrition to overall health  Encouraged patient to have good source of protein (meat, dairy such as milk or yogurt, or supplement such as Boost or Ensure) at all meals/snacks  Malnutrition form entered into progress notes  Will continue to follow pt during hospital stay

## 2019-11-29 NOTE — PLAN OF CARE
Problem: Potential for Falls  Goal: Patient will remain free of falls  Description  INTERVENTIONS:  - Assess patient frequently for physical needs  -  Identify cognitive and physical deficits and behaviors that affect risk of falls  -  [place] fall precautions as indicated by assessment   - Educate patient/family on patient safety including physical limitations  - Instruct patient to call for assistance with activity based on assessment  - Modify environment to reduce risk of injury  - Consider OT/PT consult to assist with strengthening/mobility  Outcome: Progressing     Problem: Prexisting or High Potential for Compromised Skin Integrity  Goal: Skin integrity is maintained or improved  Description  INTERVENTIONS:  - Identify patients at risk for skin breakdown  - Assess and monitor skin integrity  - Assess and monitor nutrition and hydration status  - Monitor labs   - Assess for incontinence   - Turn and reposition patient  - Assist with mobility/ambulation  - Relieve pressure over bony prominences  - Avoid friction and shearing  - Provide appropriate hygiene as needed including keeping skin clean and dry  - Evaluate need for skin moisturizer/barrier cream  - Collaborate with interdisciplinary team   - Patient/family teaching  - Consider wound care consult   Outcome: Progressing     Problem: Nutrition/Hydration-ADULT  Goal: Nutrient/Hydration intake appropriate for improving, restoring or maintaining nutritional needs  Description  Monitor and assess patient's nutrition/hydration status for malnutrition  Collaborate with interdisciplinary team and initiate plan and interventions as ordered  Monitor patient's weight and dietary intake as ordered or per policy  Utilize nutrition screening tool and intervene as necessary  Determine patient's food preferences and provide high-protein, high-caloric foods as appropriate       INTERVENTIONS:  - Monitor oral intake, urinary output, labs, and treatment plans  - Assess nutrition and hydration status and recommend course of action  - Evaluate amount of meals eaten  - Assist patient with eating if necessary   - Allow adequate time for meals  - Recommend/ encourage appropriate diets, oral nutritional supplements, and vitamin/mineral supplements  - Order, calculate, and assess calorie counts as needed  - Recommend, monitor, and adjust tube feedings and TPN/PPN based on assessed needs  - Assess need for intravenous fluids  - Provide specific nutrition/hydration education as appropriate  - Include patient/family/caregiver in decisions related to nutrition  Outcome: Progressing     Problem: PAIN - ADULT  Goal: Verbalizes/displays adequate comfort level or baseline comfort level  Description  Interventions:  - Encourage patient to monitor pain and request assistance  - Assess pain using appropriate pain scale  - Administer analgesics based on type and severity of pain and evaluate response  - Implement non-pharmacological measures as appropriate and evaluate response  - Consider cultural and social influences on pain and pain management  - Notify physician/advanced practitioner if interventions unsuccessful or patient reports new pain  Outcome: Progressing     Problem: SAFETY ADULT  Goal: Maintain or return to baseline ADL function  Description  INTERVENTIONS:  -  Assess patient's ability to carry out ADLs; assess patient's baseline for ADL function and identify physical deficits which impact ability to perform ADLs (bathing, care of mouth/teeth, toileting, grooming, dressing, etc )  - Assess/evaluate cause of self-care deficits   - Assess range of motion  - Assess patient's mobility; develop plan if impaired  - Assess patient's need for assistive devices and provide as appropriate  - Encourage maximum independence but intervene and supervise when necessary  - Involve family in performance of ADLs  - Assess for home care needs following discharge   - Consider OT consult to assist with ADL evaluation and planning for discharge  - Provide patient education as appropriate  Outcome: Progressing  Goal: Maintain or return mobility status to optimal level  Description  INTERVENTIONS:  - Assess patient's baseline mobility status (ambulation, transfers, stairs, etc )    - Identify cognitive and physical deficits and behaviors that affect mobility  - Identify mobility aids required to assist with transfers and/or ambulation (gait belt, sit-to-stand, lift, walker, cane, etc )  - [place] fall precautions as indicated by assessment  - Record patient progress and toleration of activity level on Mobility SBAR; progress patient to next Phase/Stage  - Instruct patient to call for assistance with activity based on assessment  - Consider rehabilitation consult to assist with strengthening/weightbearing, etc   Outcome: Progressing     Problem: DISCHARGE PLANNING  Goal: Discharge to home or other facility with appropriate resources  Description  INTERVENTIONS:  - Identify barriers to discharge w/patient and caregiver  - Arrange for needed discharge resources and transportation as appropriate  - Identify discharge learning needs (meds, wound care, etc )  - Arrange for interpretive services to assist at discharge as needed  - Refer to Case Management Department for coordinating discharge planning if the patient needs post-hospital services based on physician/advanced practitioner order or complex needs related to functional status, cognitive ability, or social support system  Outcome: Progressing     Problem: Knowledge Deficit  Goal: Patient/family/caregiver demonstrates understanding of disease process, treatment plan, medications, and discharge instructions  Description  Complete learning assessment and assess knowledge base    Interventions:  - Provide teaching at level of understanding  - Provide teaching via preferred learning methods  Outcome: Progressing     Problem: MUSCULOSKELETAL - ADULT  Goal: Maintain or return mobility to safest level of function  Description  INTERVENTIONS:  - Assess patient's ability to carry out ADLs; assess patient's baseline for ADL function and identify physical deficits which impact ability to perform ADLs (bathing, care of mouth/teeth, toileting, grooming, dressing, etc )  - Assess/evaluate cause of self-care deficits   - Assess range of motion  - Assess patient's mobility  - Assess patient's need for assistive devices and provide as appropriate  - Encourage maximum independence but intervene and supervise when necessary  - Involve family in performance of ADLs  - Assess for home care needs following discharge   - Consider OT consult to assist with ADL evaluation and planning for discharge  - Provide patient education as appropriate  Outcome: Progressing  Goal: Maintain proper alignment of affected body part  Description  INTERVENTIONS:  - Support, maintain and protect limb and body alignment  - Provide patient/ family with appropriate education  Outcome: Progressing

## 2019-11-29 NOTE — ASSESSMENT & PLAN NOTE
No acute exacerbation  Pt stopped maintenance inhalers due to side effects; continue p r n   Albuterol HFA

## 2019-11-29 NOTE — SOCIAL WORK
CM met with pt at bedside to plan for d/c  Pt lives with her daughter in a ranch style home  Pt reports her son takes her to all her apportionments and does her food shopping, etc   Pt has a walker and cane at home  Pt reports no hx of falls in the past   PCP is Dr Brandi Henriquez  No VNA  Pt reports her family will taker her home after d/c   Pt does not have a POA  CM will need to follow for PT/OT eval; it is likely pt will need to go to rehab

## 2019-11-29 NOTE — ASSESSMENT & PLAN NOTE
Difficulty ambulating and bearing weight after fall  PT OT consult  Case Mgmt consult for possible post acute rehab placement vs Home PT

## 2019-11-29 NOTE — ASSESSMENT & PLAN NOTE
Incidental finding on CT: Incidental Rt adnexal unilocular cystic mass measuring 4 8 x 5 3 x 5 7 cm    Consider further evaluation with pelvic ultrasound on a nonemergent basis

## 2019-11-29 NOTE — CONSULTS
Consult - [id number] [facility] 80 y o  female MRN: [id number]  Unit/Bed#: E5 -01 Encounter: [id number]    Assessment/Plan     Assessment:  81yo pleasant female with PMH of COPD, Factor V Leiden on Warfarin, HTN with recent fall at home presenting with right hip pain, podiatry consulted for onychomycosis and right lower leg wound    Plan:  - after verbal consent obtained from patient, right lower leg wound was excisionally debrided with scissors to remove all nonviable, devitalized tissue down to red granular base, wound measures approximately 2 5cm x 2 5cm x 0 5cm, no acute signs of infection, continue with local wound care  - nails x10 were debrided to remove all subungual debris without incidence  - patient tolerated procedure well without immediate complications  - rest of care per primary and ortho    History of Present Illness     HPI:  Kelly Guzman is a 80 y o  female with PMH significant for COPD, factor V Leiden on warfarin, hypertension presents after sustaining fall at home with right hip pain, podiatry is consulted for onychomycosis and right lower leg wound  Patient states she lives at home with her daughter who helps with her ADLs  She states she ambulates well with an assistive device  States she does not tend to her toe nails often as she has a hard time reaching them, pressure in shoes do cause her discomfort  She also states she bumped into something sharp against her right leg couple weeks ago that has caused some bruising, she does bruise easily due to being on warfarin  Denies pain to her right leg wound currently  Denies SOB, CP, nausea, vomiting, fever, chills  Consults  Review of Systems   Constitutional: Negative  HENT: Negative  Eyes: Negative  Respiratory: Negative  Cardiovascular: Negative  Gastrointestinal: Negative  Musculoskeletal: negative   Skin:right leg wound and long nails   Neurological: Negative  Psych: negative         Historical Information   Past Medical History:   Diagnosis Date    Abnormal blood chemistry     last assessed: 09/20/14    Asthma     Breast cancer ([facility] ) 08/2008    COPD (chronic obstructive pulmonary disease) (HCC)     DVT (deep venous thrombosis) (HCC)     Edema     last assessed: 06/25/15    Gait disturbance     last assessed: 05/14/14    Herpes zoster     last assessed: 11/20/12    Malignant neoplasm of breast (female) [facility])     last assessed: 10/31/12    Open wound of ankle     last assessed: 07/29/15    Pulmonary embolism ([facility] ) 07/2011     Past Surgical History:   Procedure Laterality Date    NO PAST SURGERIES       Social History   Social History     Substance and Sexual Activity   Alcohol Use No     Social History     Substance and Sexual Activity   Drug Use No     Social History     Tobacco Use   Smoking Status Former Smoker   Smokeless Tobacco Never Used     Family History:   Family History   Problem Relation Age of Onset    Diabetes Mother     Other Daughter         Agoraphobia    Hypertension Daughter     Cervical cancer Family     Emphysema Family        Meds/Allergies   Medications Prior to Admission   Medication    acetaminophen (TYLENOL) 325 mg tablet    albuterol (2 5 mg/3 mL) 0 083 % nebulizer solution    albuterol (VENTOLIN HFA) 90 mcg/act inhaler    atorvastatin (LIPITOR) 20 mg tablet    LORazepam (ATIVAN) 1 mg tablet    traMADol (ULTRAM) 50 mg tablet    warfarin (COUMADIN) 1 mg tablet    FLOVENT  MCG/ACT inhaler    lisinopril (ZESTRIL) 10 mg tablet    Respiratory Therapy Supplies (NEBULIZER COMPRESSOR) KIT    SEREVENT DISKUS 50 MCG/DOSE diskus inhaler     Allergies   Allergen Reactions    Dog Epithelium     Cat Hair Extract Sneezing    Ciprofloxacin Hives    Erythromycin Nausea Only    Sulfa Antibiotics Edema       Objective   First Vitals:   Blood Pressure: (!) 178/87 (11/28/19 1859)  Pulse: (!) 110 (11/28/19 1859)  Temperature: 99 1 °F (37 3 °C) (11/28/19 1859)  Temp Source: Temporal (11/28/19 2300)  Respirations: 18 (11/28/19 1859)  Weight - Scale: 54 kg (119 lb 0 8 oz) (11/28/19 1859)  SpO2: 93 % (11/28/19 1859)    Current Vitals:   Blood Pressure: 158/74 (11/29/19 0748)  Pulse: 95 (11/29/19 0748)  Temperature: 97 8 °F (36 6 °C) (11/29/19 0748)  Temp Source: Temporal (11/29/19 0748)  Respirations: 16 (11/29/19 0748)  Weight - Scale: 54 kg (119 lb 0 8 oz) (11/28/19 1859)  SpO2: 94 % (11/29/19 0748)        /74 (BP Location: Left arm)   Pulse 95   Temp 97 8 °F (36 6 °C) (Temporal)   Resp 16   Wt 54 kg (119 lb 0 8 oz)   LMP  (LMP Unknown)   SpO2 94%   BMI 21 77 kg/m²      General Appearance:    Alert, cooperative, no distress   Head:    Normocephalic, without obvious abnormality, atraumatic   Eyes:    PERRL, conjunctiva/corneas clear, EOM's intact        Nose:   Moist mucous membranes   Neck:   Supple, symmetrical, trachea midline   Back:     Symmetric   Lungs:     Respirations unlabored, clear to ascultations    Heart:    Regular rate and rhythm, S1 and S2 normal, no murmur, rub   or gallop   Abdomen:     Soft, non-tender     Lower Extremities     Dermatologic:          Vascular:   Skin is dry, shiny and atrophic, discoloration noted on bilateral lower legs with hemosiderin deposit, nails are thickened, dystrophic, yellowed with subungual debris; right lower leg wound bed is red and granular, no acute signs of infection, measures approximately 2 5cm x 2 5cm x 0 5cm    Pedal pulses palpable   Musculoskeletal:   MSK function WNL, guarding due to right hip pain   Neurologic:   Gross sensation is intact  Protective sensation is intact                 Lab Results:   Admission on 11/28/2019   Component Date Value    WBC 11/28/2019 13 32*    RBC 11/28/2019 3 89     Hemoglobin 11/28/2019 12 5     Hematocrit 11/28/2019 39 1     MCV 11/28/2019 101*    MCH 11/28/2019 32 1     MCHC 11/28/2019 32 0     RDW 11/28/2019 14 3     MPV 11/28/2019 10 0     Platelets 11/28/2019 137*    nRBC 11/28/2019 0     Neutrophils Relative 11/28/2019 80*    Immat GRANS % 11/28/2019 1     Lymphocytes Relative 11/28/2019 8*    Monocytes Relative 11/28/2019 10     Eosinophils Relative 11/28/2019 1     Basophils Relative 11/28/2019 0     Neutrophils Absolute 11/28/2019 10 67*    Immature Grans Absolute 11/28/2019 0 08     Lymphocytes Absolute 11/28/2019 1 12     Monocytes Absolute 11/28/2019 1 32*    Eosinophils Absolute 11/28/2019 0 11     Basophils Absolute 11/28/2019 0 02     Sodium 11/28/2019 139     Potassium 11/28/2019 3 7     Chloride 11/28/2019 103     CO2 11/28/2019 26     ANION GAP 11/28/2019 10     BUN 11/28/2019 23     Creatinine 11/28/2019 0 79     Glucose 11/28/2019 119     Calcium 11/28/2019 8 8     eGFR 11/28/2019 64     Protime 11/28/2019 24 0*    INR 11/28/2019 2 10*    PTT 11/28/2019 38*    Sodium 11/29/2019 136     Potassium 11/29/2019 3 8     Chloride 11/29/2019 103     CO2 11/29/2019 25     ANION GAP 11/29/2019 8     BUN 11/29/2019 22     Creatinine 11/29/2019 0 64     Glucose 11/29/2019 101     Calcium 11/29/2019 8 5     eGFR 11/29/2019 77     WBC 11/29/2019 11 12*    RBC 11/29/2019 3 62*    Hemoglobin 11/29/2019 11 6     Hematocrit 11/29/2019 37 0     MCV 11/29/2019 102*    MCH 11/29/2019 32 0     MCHC 11/29/2019 31 4     RDW 11/29/2019 14 3     MPV 11/29/2019 10 2     Platelets 42/56/5171 121*    nRBC 11/29/2019 0     Neutrophils Relative 11/29/2019 73     Immat GRANS % 11/29/2019 1     Lymphocytes Relative 11/29/2019 10*    Monocytes Relative 11/29/2019 13*    Eosinophils Relative 11/29/2019 3     Basophils Relative 11/29/2019 0     Neutrophils Absolute 11/29/2019 8 27*    Immature Grans Absolute 11/29/2019 0 06     Lymphocytes Absolute 11/29/2019 1 09     Monocytes Absolute 11/29/2019 1 40*    Eosinophils Absolute 11/29/2019 0 28     Basophils Absolute 11/29/2019 0 02     Protime 11/29/2019 23 8*    INR 11/29/2019 2 08*         Imaging: I have personally reviewed pertinent films in PACS  EKG, Pathology, and Other Studies: I have personally reviewed pertinent reports  Code Status: Level 1 - Full Code      Portions of the record may have been created with voice recognition software  Occasional wrong word or "sound a like" substitutions may have occurred due to the inherent limitations of voice recognition software  Read the chart carefully and recognize, using context, where substitutions have occurred

## 2019-11-29 NOTE — DISCHARGE INSTRUCTIONS
Discharge Instructions - Podiatry    Weight Bearing Status: weight bear as tolerated                       Pain: Continue analgesics as directed    Follow-up appointment instructions: Please make an appointment within two weeks of discharge at [address]  Contact sooner if any increase in pain, or signs of infection occur    Wound Care: change right lower leg wound every other day: apply maxorb alginate dressing then secure with tegarderm

## 2019-11-29 NOTE — PLAN OF CARE
Problem: Potential for Falls  Goal: Patient will remain free of falls  Description  INTERVENTIONS:  - Assess patient frequently for physical needs  -  Identify cognitive and physical deficits and behaviors that affect risk of falls  -  [place] fall precautions as indicated by assessment   - Educate patient/family on patient safety including physical limitations  - Instruct patient to call for assistance with activity based on assessment  - Modify environment to reduce risk of injury  - Consider OT/PT consult to assist with strengthening/mobility  Outcome: Progressing     Problem: Prexisting or High Potential for Compromised Skin Integrity  Goal: Skin integrity is maintained or improved  Description  INTERVENTIONS:  - Identify patients at risk for skin breakdown  - Assess and monitor skin integrity  - Assess and monitor nutrition and hydration status  - Monitor labs   - Assess for incontinence   - Turn and reposition patient  - Assist with mobility/ambulation  - Relieve pressure over bony prominences  - Avoid friction and shearing  - Provide appropriate hygiene as needed including keeping skin clean and dry  - Evaluate need for skin moisturizer/barrier cream  - Collaborate with interdisciplinary team   - Patient/family teaching  - Consider wound care consult   Outcome: Progressing     Problem: Nutrition/Hydration-ADULT  Goal: Nutrient/Hydration intake appropriate for improving, restoring or maintaining nutritional needs  Description  Monitor and assess patient's nutrition/hydration status for malnutrition  Collaborate with interdisciplinary team and initiate plan and interventions as ordered  Monitor patient's weight and dietary intake as ordered or per policy  Utilize nutrition screening tool and intervene as necessary  Determine patient's food preferences and provide high-protein, high-caloric foods as appropriate       INTERVENTIONS:  - Monitor oral intake, urinary output, labs, and treatment plans  - Assess nutrition and hydration status and recommend course of action  - Evaluate amount of meals eaten  - Assist patient with eating if necessary   - Allow adequate time for meals  - Recommend/ encourage appropriate diets, oral nutritional supplements, and vitamin/mineral supplements  - Order, calculate, and assess calorie counts as needed  - Recommend, monitor, and adjust tube feedings and TPN/PPN based on assessed needs  - Assess need for intravenous fluids  - Provide specific nutrition/hydration education as appropriate  - Include patient/family/caregiver in decisions related to nutrition  Outcome: Progressing

## 2019-11-30 NOTE — PHYSICAL THERAPY NOTE
PHYSICAL THERAPY CANCELLATION NOTE    PT orders received  Pt pending ortho consult secondary to R pubic bone fx  Will hold PT IE until cleared by ortho and pt appropriate for therapy       Eli Shelley, PT

## 2019-11-30 NOTE — PLAN OF CARE
Problem: Potential for Falls  Goal: Patient will remain free of falls  Description  INTERVENTIONS:  - Assess patient frequently for physical needs  -  Identify cognitive and physical deficits and behaviors that affect risk of falls  -  [place] fall precautions as indicated by assessment   - Educate patient/family on patient safety including physical limitations  - Instruct patient to call for assistance with activity based on assessment  - Modify environment to reduce risk of injury  - Consider OT/PT consult to assist with strengthening/mobility  Outcome: Progressing     Problem: Prexisting or High Potential for Compromised Skin Integrity  Goal: Skin integrity is maintained or improved  Description  INTERVENTIONS:  - Identify patients at risk for skin breakdown  - Assess and monitor skin integrity  - Assess and monitor nutrition and hydration status  - Monitor labs   - Assess for incontinence   - Turn and reposition patient  - Assist with mobility/ambulation  - Relieve pressure over bony prominences  - Avoid friction and shearing  - Provide appropriate hygiene as needed including keeping skin clean and dry  - Evaluate need for skin moisturizer/barrier cream  - Collaborate with interdisciplinary team   - Patient/family teaching  - Consider wound care consult   Outcome: Progressing     Problem: Nutrition/Hydration-ADULT  Goal: Nutrient/Hydration intake appropriate for improving, restoring or maintaining nutritional needs  Description  Monitor and assess patient's nutrition/hydration status for malnutrition  Collaborate with interdisciplinary team and initiate plan and interventions as ordered  Monitor patient's weight and dietary intake as ordered or per policy  Utilize nutrition screening tool and intervene as necessary  Determine patient's food preferences and provide high-protein, high-caloric foods as appropriate       INTERVENTIONS:  - Monitor oral intake, urinary output, labs, and treatment plans  - Assess nutrition and hydration status and recommend course of action  - Evaluate amount of meals eaten  - Assist patient with eating if necessary   - Allow adequate time for meals  - Recommend/ encourage appropriate diets, oral nutritional supplements, and vitamin/mineral supplements  - Order, calculate, and assess calorie counts as needed  - Recommend, monitor, and adjust tube feedings and TPN/PPN based on assessed needs  - Assess need for intravenous fluids  - Provide specific nutrition/hydration education as appropriate  - Include patient/family/caregiver in decisions related to nutrition  Outcome: Progressing     Problem: PAIN - ADULT  Goal: Verbalizes/displays adequate comfort level or baseline comfort level  Description  Interventions:  - Encourage patient to monitor pain and request assistance  - Assess pain using appropriate pain scale  - Administer analgesics based on type and severity of pain and evaluate response  - Implement non-pharmacological measures as appropriate and evaluate response  - Consider cultural and social influences on pain and pain management  - Notify physician/advanced practitioner if interventions unsuccessful or patient reports new pain  Outcome: Progressing     Problem: SAFETY ADULT  Goal: Maintain or return to baseline ADL function  Description  INTERVENTIONS:  -  Assess patient's ability to carry out ADLs; assess patient's baseline for ADL function and identify physical deficits which impact ability to perform ADLs (bathing, care of mouth/teeth, toileting, grooming, dressing, etc )  - Assess/evaluate cause of self-care deficits   - Assess range of motion  - Assess patient's mobility; develop plan if impaired  - Assess patient's need for assistive devices and provide as appropriate  - Encourage maximum independence but intervene and supervise when necessary  - Involve family in performance of ADLs  - Assess for home care needs following discharge   - Consider OT consult to assist with ADL evaluation and planning for discharge  - Provide patient education as appropriate  Outcome: Progressing  Goal: Maintain or return mobility status to optimal level  Description  INTERVENTIONS:  - Assess patient's baseline mobility status (ambulation, transfers, stairs, etc )    - Identify cognitive and physical deficits and behaviors that affect mobility  - Identify mobility aids required to assist with transfers and/or ambulation (gait belt, sit-to-stand, lift, walker, cane, etc )  - [place] fall precautions as indicated by assessment  - Record patient progress and toleration of activity level on Mobility SBAR; progress patient to next Phase/Stage  - Instruct patient to call for assistance with activity based on assessment  - Consider rehabilitation consult to assist with strengthening/weightbearing, etc   Outcome: Progressing     Problem: DISCHARGE PLANNING  Goal: Discharge to home or other facility with appropriate resources  Description  INTERVENTIONS:  - Identify barriers to discharge w/patient and caregiver  - Arrange for needed discharge resources and transportation as appropriate  - Identify discharge learning needs (meds, wound care, etc )  - Arrange for interpretive services to assist at discharge as needed  - Refer to Case Management Department for coordinating discharge planning if the patient needs post-hospital services based on physician/advanced practitioner order or complex needs related to functional status, cognitive ability, or social support system  Outcome: Progressing     Problem: Knowledge Deficit  Goal: Patient/family/caregiver demonstrates understanding of disease process, treatment plan, medications, and discharge instructions  Description  Complete learning assessment and assess knowledge base    Interventions:  - Provide teaching at level of understanding  - Provide teaching via preferred learning methods  Outcome: Progressing     Problem: MUSCULOSKELETAL - ADULT  Goal: Maintain or return mobility to safest level of function  Description  INTERVENTIONS:  - Assess patient's ability to carry out ADLs; assess patient's baseline for ADL function and identify physical deficits which impact ability to perform ADLs (bathing, care of mouth/teeth, toileting, grooming, dressing, etc )  - Assess/evaluate cause of self-care deficits   - Assess range of motion  - Assess patient's mobility  - Assess patient's need for assistive devices and provide as appropriate  - Encourage maximum independence but intervene and supervise when necessary  - Involve family in performance of ADLs  - Assess for home care needs following discharge   - Consider OT consult to assist with ADL evaluation and planning for discharge  - Provide patient education as appropriate  Outcome: Progressing  Goal: Maintain proper alignment of affected body part  Description  INTERVENTIONS:  - Support, maintain and protect limb and body alignment  - Provide patient/ family with appropriate education  Outcome: Progressing

## 2019-11-30 NOTE — PROGRESS NOTES
[facility] Internal Medicine Progress Note  Patient: Beryl Neumann 80 y o  female   MRN: [id number]  PCP: Alan Morris DO  Unit/Bed#: E5 -54 Encounter: [id number]  Date Of Visit: 11/30/19    Assessment:    Principal Problem:    Fall at home, initial encounter  Active Problems:    COPD (chronic obstructive pulmonary disease) ([facility] )    Factor V Leiden ([place] [address] )    Anxiety    Essential hypertension    Ambulatory dysfunction    Acute respiratory failure with hypoxia (HCC)    SIRS (systemic inflammatory response syndrome) ([place] [address] )    Cyst of right ovary    Onychomycosis    Moderate protein-calorie malnutrition ([place] [address] )      Plan:    # Fall (mechanical) w/ acute displaced fracture of the right inferior pubic bone, nondisplaced fracture of right superior pubic full  - s/p orthopedics consult, nonoperable  - pending pt/ot  - continue p r n  pain analgesics  - PT/OT once confirmed weight-bearing status     # Ambulatory dysfunction  - pt/ot pending    # Onychomycosis/RT anterior tibia blood blister/wound s/p fall  - s/p podiatry input  - s/p bedside debridement, no sign of acute infxn  - cont wound care    # Acute hypoxic resp failure   - unclear of etiology  - s/p cxr no acute pathology  - low suspicion for PE given already on a/c with therapeutic INR  - underlying hx of copd/asthma; no sign of exacerbation but is possible pt may have progressed to requiring oxygen  - eval for home o2 upon discharge  - cont to attempt to wean off    # Hx of factor V leiden  - INR therapeutic  - cont coumadin    # SIRs criteria, non infectious etiology  - monitor off abx  - resolved leukocytosis    # Hypokalemia - replete accordingly    # Essential htn - cont meds    # Moderate protein-caloric malnutrition  - s/p nutrition consult    # RT adnexal cystic mass, incidental finding  - pelvic US as outpatient    VTE Pharmacologic Prophylaxis:   Pharmacologic: Warfarin (Coumadin)  Mechanical VTE Prophylaxis in Place: No    Patient Centered Rounds:  I have performed bedside rounds with nursing staff today  Discussions with Specialists or Other Care Team Provider:    Education and Discussions with Family / Patient: Patient    Time Spent for Care: 30 minutes  More than 50% of total time spent on counseling and coordination of care as described above  Current Length of Stay: 2 day(s)    Current Patient Status: Inpatient   Certification Statement: The patient will continue to require additional inpatient hospital stay due to pain control    Discharge Plan / Estimated Discharge Date:     Code Status: Level 1 - Full Code      Subjective:   Patient seen and examined  RLE pain slightly improved  Denies dyspnea though still remains on oxygen  No acute events overnight    Objective:     Vitals:   Temp (24hrs), Av 1 °F (36 7 °C), Min:97 8 °F (36 6 °C), Max:98 5 °F (36 9 °C)    Temp:  [97 8 °F (36 6 °C)-98 5 °F (36 9 °C)] 97 9 °F (36 6 °C)  HR:  [] 89  Resp:  [18] 18  BP: (125-153)/(56-61) 139/59  SpO2:  [93 %-96 %] 94 %  Body mass index is 21 77 kg/m²  Input and Output Summary (last 24 hours): Intake/Output Summary (Last 24 hours) at 2019 1515  Last data filed at 2019 0901  Gross per 24 hour   Intake 240 ml   Output 400 ml   Net -160 ml       Physical Exam:     Physical Exam   Constitutional: She is oriented to person, place, and time  She appears well-developed and well-nourished  Neck: Neck supple  Cardiovascular: Normal rate, regular rhythm, normal heart sounds and intact distal pulses  Exam reveals no gallop and no friction rub  No murmur heard  Pulmonary/Chest: Effort normal  No stridor  No respiratory distress  She has no wheezes  She has no rales  She exhibits no tenderness  Diminished breath sounds   Abdominal: Soft  Bowel sounds are normal  She exhibits no distension  There is no tenderness  There is no rebound and no guarding  Musculoskeletal: She exhibits tenderness  She exhibits no edema or deformity     Limited ROM of RLE due to pain   Neurological: She is alert and oriented to person, place, and time  She displays normal reflexes  No cranial nerve deficit or sensory deficit  She exhibits normal muscle tone  Coordination normal    Skin:   Chronic skin changes in b/l LE secondary to venous stasis  + blood blister on rt anterior tibia     Additional Data:     Labs:    Results from last 7 days   Lab Units 11/30/19  0547 11/29/19  0545   WBC Thousand/uL 9 18 11 12*   HEMOGLOBIN g/dL 11 0* 11 6   HEMATOCRIT % 34 5* 37 0   PLATELETS Thousands/uL 137* 121*   NEUTROS PCT %  --  73   LYMPHS PCT %  --  10*   MONOS PCT %  --  13*   EOS PCT %  --  3     Results from last 7 days   Lab Units 11/30/19  0547   POTASSIUM mmol/L 3 4*   CHLORIDE mmol/L 103   CO2 mmol/L 28   BUN mg/dL 20   CREATININE mg/dL 0 70   CALCIUM mg/dL 8 2*     Results from last 7 days   Lab Units 11/30/19  0719   INR  2 02*       * I Have Reviewed All Lab Data Listed Above  * Additional Pertinent Lab Tests Reviewed:  All Labs Within Last 24 Hours Reviewed    Imaging:    Imaging Reports Reviewed Today Include:   Imaging Personally Reviewed by Myself Includes:      Recent Cultures (last 7 days):           Last 24 Hours Medication List:     Current Facility-Administered Medications:  acetaminophen 650 mg Oral Q6H PRN Hilarie Babinski, CRNP   albuterol 2 puff Inhalation Q6H PRN Hilarie Babinski, CRNP   albuterol 2 5 mg Nebulization BID Hilarie Babinski, CRNP   aluminum-magnesium hydroxide-simethicone 30 mL Oral Q6H PRN Hilarie Babinski, CRNP   atorvastatin 20 mg Oral Daily Hilarie Babinski, CRNP   benzonatate 100 mg Oral TID PRN Hilarie Babinski, CRNP   docusate sodium 100 mg Oral BID Major Herder, DO   HYDROmorphone 0 2 mg Intravenous Q4H PRN Hilarie Babinski, CRNP   lisinopril 10 mg Oral Daily Major Herder, DO   LORazepam 1 mg Oral Q12H PRN Hilarie Babinski, CRNP   ondansetron 4 mg Intravenous Q6H PRN Hilarie Babinski, CRNP   oxyCODONE 2 5 mg Oral Q4H PRN MOISES Waldron   oxyCODONE 5 mg Oral Q4H PRN Dalila Diesel, DO   polyethylene glycol 17 g Oral Daily PRN Dalila Diesel, DO   salmeterol 1 puff Inhalation Q12H Dalila Diesel, DO   senna-docusate sodium 1 tablet Oral HS Dalila Diesel, DO   warfarin 3 mg Oral Daily (warfarin) MOISES Waldron        Today, Patient Was Seen By: Dalila Nguyen DO    ** Please Note: This note has been constructed using a voice recognition system   **

## 2019-11-30 NOTE — CONSULTS
Orthopedics   Jeramy Rodrigues 80 y o  female MRN: [id number]  Unit/Bed#: E5 -32      Chief Complaint:   right anterior pelvis pain    HPI:   80 y o  female ambulates with walker status post mechanical fall at home complaining or right groin pain  The patient reports  She fell at home on 11/27/19 around 9pm at night  She presented to the ED on 11/28/19 due to right hip pain and ambulatory dysfunction  She denies hitting her head during the fall  Prior to the fall, she denies experiencing groin pain  She lives with her daughter at home  She denies numbness and tingling in bilateral lower extremities  Review Of Systems:   · Skin: Normal  · Neuro: See HPI  · Musculoskeletal: See HPI  · 14 point review of systems negative except as stated above     Past Medical History:   Past Medical History:   Diagnosis Date    Abnormal blood chemistry     last assessed: 09/20/14    Asthma     Breast cancer ([facility] ) 08/2008    COPD (chronic obstructive pulmonary disease) ([facility] )     [facility]     Edema     last assessed: 06/25/15    Gait disturbance     last assessed: 05/14/14    Herpes zoster     last assessed: 11/20/12    Malignant neoplasm of breast (female[facility])     last assessed: 10/31/12    Open wound of ankle     last assessed: 07/29/15    Pulmonary embolism ([facility] ) 07/2011       Past Surgical History:   Past Surgical History:   Procedure Laterality Date    NO PAST SURGERIES         Family History:  Family history reviewed and non-contributory  Family History   Problem Relation Age of Onset    Diabetes Mother     Other Daughter         Agoraphobia    Hypertension Daughter     Cervical cancer Family     Emphysema Family        Social History:  Social History     Socioeconomic History    Marital status:       Spouse name: None    Number of children: None    Years of education: None    Highest education level: None   Occupational History    None   Social Needs    Financial resource strain: None    Food insecurity:     Worry: None     Inability: None    Transportation needs:     Medical: None     Non-medical: None   Tobacco Use    Smoking status: Former Smoker    Smokeless tobacco: Never Used   Substance and Sexual Activity    Alcohol use: No    Drug use: No    Sexual activity: None   Lifestyle    Physical activity:     Days per week: None     Minutes per session: None    Stress: None   Relationships    Social connections:     Talks on phone: None     Gets together: None     Attends Spiritism service: None     Active member of club or organization: None     Attends meetings of clubs or organizations: None     Relationship status: None    Intimate partner violence:     Fear of current or ex partner: None     Emotionally abused: None     Physically abused: None     Forced sexual activity: None   Other Topics Concern    None   Social History Narrative    None       Allergies:    Allergies   Allergen Reactions    Dog Epithelium     Cat Hair Extract Sneezing    Ciprofloxacin Hives    Erythromycin Nausea Only    Sulfa Antibiotics Edema           Labs:  0   Lab Value Date/Time    HCT 34 5 (L) 11/30/2019 0547    HCT 37 0 11/29/2019 0545    HCT 39 1 11/28/2019 1940    HCT 42 3 01/14/2015 1106    HCT 43 0 12/31/2014 1406    HGB 11 0 (L) 11/30/2019 0547    HGB 11 6 11/29/2019 0545    HGB 12 5 11/28/2019 1940    HGB 13 5 01/14/2015 1106    HGB 13 6 12/31/2014 1406    INR 2 02 (H) 11/30/2019 0719    INR 2 75 (H) 02/27/2015 1153    WBC 9 18 11/30/2019 0547    WBC 11 12 (H) 11/29/2019 0545    WBC 13 32 (H) 11/28/2019 1940    WBC 10 69 (H) 01/14/2015 1106    WBC 8 16 12/31/2014 1406       Meds:    Current Facility-Administered Medications:     acetaminophen (TYLENOL) tablet 650 mg, 650 mg, Oral, Q6H PRN, MOISES Butcher    albuterol (PROVENTIL HFA,VENTOLIN HFA) inhaler 2 puff, 2 puff, Inhalation, Q6H PRN, MOISES Butcher, 2 puff at 11/29/19 1537    albuterol inhalation solution 2 5 mg, 2 5 mg, Nebulization, BID, Berdie Peel, CRNP, 2 5 mg at 11/30/19 0829    aluminum-magnesium hydroxide-simethicone (MYLANTA) 200-200-20 mg/5 mL oral suspension 30 mL, 30 mL, Oral, Q6H PRN, Berdie Peel, CRNP    atorvastatin (LIPITOR) tablet 20 mg, 20 mg, Oral, Daily, Berdie Peel, CRNP, 20 mg at 11/30/19 0804    benzonatate (TESSALON PERLES) capsule 100 mg, 100 mg, Oral, TID PRN, Berdie Peel, CRNP    docusate sodium (COLACE) capsule 100 mg, 100 mg, Oral, BID, Hollice Cambric, DO, 100 mg at 11/30/19 0804    HYDROmorphone (DILAUDID) injection 0 2 mg, 0 2 mg, Intravenous, Q4H PRN, Berdie Peel, CRNP    lisinopril (ZESTRIL) tablet 10 mg, 10 mg, Oral, Daily, Hollice Cambric, DO, 10 mg at 11/30/19 0804    LORazepam (ATIVAN) tablet 1 mg, 1 mg, Oral, Q12H PRN, Berdie Peel, CRNP, 1 mg at 11/29/19 2339    ondansetron (ZOFRAN) injection 4 mg, 4 mg, Intravenous, Q6H PRN, Berdie Peel, CRNP    oxyCODONE (ROXICODONE) IR tablet 2 5 mg, 2 5 mg, Oral, Q4H PRN, Berdie Peel, CRNP, 2 5 mg at 11/29/19 2218    oxyCODONE (ROXICODONE) IR tablet 5 mg, 5 mg, Oral, Q4H PRN, Hollice Cambric, DO, 5 mg at 11/30/19 3951    polyethylene glycol (MIRALAX) packet 17 g, 17 g, Oral, Daily PRN, Hollice Cambric, DO    salmeterol (SEREVENT) 50 mcg/dose inhaler 1 puff, 1 puff, Inhalation, Q12H, Hollice Cambric, DO, 1 puff at 11/30/19 0805    senna-docusate sodium (SENOKOT S) 8 6-50 mg per tablet 1 tablet, 1 tablet, Oral, HS, Kristie Haq, DO, 1 tablet at 11/29/19 2217    warfarin (COUMADIN) tablet 3 mg, 3 mg, Oral, Daily (warfarin), MOISES Vargas, 3 mg at 11/29/19 1709    Blood Culture:   No results found for: BLOODCX    Wound Culture:   No results found for: WOUNDCULT    Ins and Outs:  I/O last 24 hours:   In: 660 [P O :660]  Out: 600 [Urine:600]          Physical Exam:   /59 (BP Location: Right arm)   Pulse 89   Temp 97 9 °F (36 6 °C) (Temporal)   Resp 18   Wt 54 kg (119 lb 0 8 oz)   LMP  (LMP Unknown)   SpO2 94%   BMI 21 77 kg/m²   Gen: Alert and oriented to person, place, time  HEENT: EOMI, eyes clear, moist mucus membranes, hearing intact  Respiratory: Bilateral chest rise  No audible wheezing found  Cardiovascular: Regular Rate and Rhythm  Abdomen: soft nontender/nondistended  Musculoskeletal: right lower extremity  · Skin intact, there is ecchymosis noted on the posterolateral aspect of the thigh and the medial aspect of the heel  There is chronic skin changes of lower legs bilaterally  A skin tear is present on the anteromedial aspect of the mid-tibial shaft that is covered with a Tegaderm  No erythema, no drainage  · Mild tenderness to palpation over anterior pelvis  · Leg lengths equal  · No pain with passive hip ROM, pain with hip AROM  · Sensation intact L3-S1  · Positive knee flexion/extension, ankle dorsi/plantar flexion, EHL/FHL  · 2+DP pulse, limbs are warm and well perfused  Good color and capillary refill of the toes  Radiology:   I personally reviewed the films  X-rays pelvis shows right superior pubic rami fracture and severe, bilateral hip osteoarthritis with osteophyte formation  Per radiologist report: superior and inferior pubic rami fractures  CT chest, abdomen, pelvis: Right superior pubic rami fracture  Per radiologist report: No acute osseous abnormalities      _*_*_*_*_*_*_*_*_*_*_*_*_*_*_*_*_*_*_*_*_*_*_*_*_*_*_*_*_*_*_*_*_*_*_*_*_*_*_*_*_*    Assessment:  80 y o  female S/P  fall at home with right superior pubic rami fracture    Plan:   · Weight bearing as tolerated with assistive devices as needed for safe ambulation  · Analgesics for pain  · DVT ppx per primary team    · PT/OT  · Will need outpatient follow up with Orthopedics for repeat XRs in 2 weeks       Renetta Fournier PA-C

## 2019-12-01 NOTE — PROGRESS NOTES
[facility] Progress Note  Patient: Marisa Roach 80 y o  female   MRN: [id number]  PCP: Karen Omalley,   Unit/Bed#: E5 -82 Encounter: [id number]  Date Of Visit: 12/01/19    Assessment:    Principal Problem:    Fall at home, initial encounter  Active Problems:    COPD (chronic obstructive pulmonary disease) ([facility] )    Factor V Leiden ([place] [address] )    Anxiety    Essential hypertension    Ambulatory dysfunction    Acute respiratory failure with hypoxia (HCC)    SIRS (systemic inflammatory response syndrome) ([place] [address] )    Cyst of right ovary    Onychomycosis    Moderate protein-calorie malnutrition ([place] [address] )      Plan:    # Fall (mechanical) w/ acute displaced fracture of the right inferior pubic bone, nondisplaced fracture of right superior pubic full  - s/p orthopedics consult, nonoperable, WBAT  - s/o PT; recommending rehab  - continue p r n  pain analgesics     # Ambulatory dysfunction  - s/p pt    # Onychomycosis/RT anterior tibia blood blister/wound s/p fall  - s/p podiatry input  - s/p bedside debridement, no sign of acute infxn  - cont wound care    # Acute hypoxic resp failure   - unclear of etiology  - s/p cxr no acute pathology  - low suspicion for PE given already on a/c with therapeutic INR  - underlying hx of copd/asthma; no sign of exacerbation but it's possible pt may have progressed to requiring oxygen  - eval for home o2 upon discharge  - cont to attempt to wean off    # Hx of factor V leiden  - INR therapeutic  - cont coumadin    # SIRs criteria, non infectious etiology  - monitor off abx  - resolved leukocytosis  - + temp max of 100 5 but not sure if accurate as obtained via forehead, repeated afterwards w/o antipyretics now normal, cont to monitor    # Essential htn - cont meds    # Moderate protein-caloric malnutrition  - s/p nutrition consult    # RT adnexal cystic mass, incidental finding - communicated to patient and her son  - pelvic US as outpatient    # Moderate protein caloric malnutrition  - nutrition eval    VTE Pharmacologic Prophylaxis:   Pharmacologic: Warfarin (Coumadin)  Mechanical VTE Prophylaxis in Place: No    Patient Centered Rounds: I have performed bedside rounds with nursing staff today  Discussions with Specialists or Other Care Team Provider:    Education and Discussions with Family / Patient: Patient, and called her son as well    Time Spent for Care: 30 minutes  More than 50% of total time spent on counseling and coordination of care as described above  Current Length of Stay: 3 day(s)    Current Patient Status: Inpatient   Certification Statement: The patient will continue to require additional inpatient hospital stay due to pain control, placement pending    Discharge Plan / Estimated Discharge Date:     Code Status: Level 1 - Full Code      Subjective:   + fever upon routine vitals but obtained via forehead, not sure if accurate and rechecked w/o antipyretics and is nml  No clinical signs to suggest focal infxn  RLE pain improved with pain analgesics    Objective:     Vitals:   Temp (24hrs), Av 7 °F (37 6 °C), Min:98 5 °F (36 9 °C), Max:100 5 °F (38 1 °C)    Temp:  [98 5 °F (36 9 °C)-100 5 °F (38 1 °C)] 98 5 °F (36 9 °C)  HR:  [88-97] 90  Resp:  [18] 18  BP: ()/(47-60) 107/60  SpO2:  [92 %-95 %] 95 %  Body mass index is 21 77 kg/m²  Input and Output Summary (last 24 hours): Intake/Output Summary (Last 24 hours) at 2019 1428  Last data filed at 2019 0729  Gross per 24 hour   Intake    Output 350 ml   Net -350 ml       Physical Exam:     Physical Exam   Constitutional: She is oriented to person, place, and time  She appears well-developed and well-nourished  Neck: Neck supple  Cardiovascular: Normal rate, regular rhythm, normal heart sounds and intact distal pulses  Exam reveals no gallop and no friction rub  No murmur heard  Pulmonary/Chest: Effort normal  No stridor  No respiratory distress  She has no wheezes   She has no rales  She exhibits no tenderness  Diminished breath sounds   Abdominal: Soft  Bowel sounds are normal  She exhibits no distension  There is no tenderness  There is no rebound and no guarding  Musculoskeletal: She exhibits tenderness  She exhibits no edema or deformity  Limited ROM of RLE due to pain   Neurological: She is alert and oriented to person, place, and time  She displays normal reflexes  No cranial nerve deficit or sensory deficit  She exhibits normal muscle tone  Coordination normal    Skin:   Chronic skin changes in b/l LE secondary to venous stasis  Drsg in place on RLE tibia     Additional Data:     Labs:    Results from last 7 days   Lab Units 11/30/19  0547 11/29/19  0545   WBC Thousand/uL 9 18 11 12*   HEMOGLOBIN g/dL 11 0* 11 6   HEMATOCRIT % 34 5* 37 0   PLATELETS Thousands/uL 137* 121*   NEUTROS PCT %  --  73   LYMPHS PCT %  --  10*   MONOS PCT %  --  13*   EOS PCT %  --  3     Results from last 7 days   Lab Units 12/01/19  0508   POTASSIUM mmol/L 4 0   CHLORIDE mmol/L 104   CO2 mmol/L 28   BUN mg/dL 34*   CREATININE mg/dL 0 97   CALCIUM mg/dL 8 3     Results from last 7 days   Lab Units 12/01/19  0509   INR  2 46*       * I Have Reviewed All Lab Data Listed Above  * Additional Pertinent Lab Tests Reviewed:  All Labs Within Last 24 Hours Reviewed    Imaging:    Imaging Reports Reviewed Today Include:   Imaging Personally Reviewed by Myself Includes:      Recent Cultures (last 7 days):           Last 24 Hours Medication List:     Current Facility-Administered Medications:  acetaminophen 650 mg Oral Q6H PRN Colby RAJINDER CarreonNP   albuterol 2 puff Inhalation Q6H PRN Colby MOISES Carreon   albuterol 2 5 mg Nebulization BID ColbyMOISES Ferro   aluminum-magnesium hydroxide-simethicone 30 mL Oral Q6H PRN Colby RAJINDER CarreonNP   atorvastatin 20 mg Oral Daily MOISES Blum   benzonatate 100 mg Oral TID PRN ColbyMOISES Ferro   docusate sodium 100 mg Oral BID Holly Sepnce DO   HYDROmorphone 0 2 mg Intravenous Q4H PRN Mayo Quevedo, CRNP   lisinopril 10 mg Oral Daily Holly Spence DO   LORazepam 1 mg Oral Q12H PRN Mayo Quevedo, CRNP   ondansetron 4 mg Intravenous Q6H PRN Mayo Quevedo, CRNP   oxyCODONE 2 5 mg Oral Q4H PRN Mayo Quevedo, CRNP   oxyCODONE 5 mg Oral Q4H PRN Holly Spence DO   polyethylene glycol 17 g Oral Daily PRN Holly Spence DO   salmeterol 1 puff Inhalation Q12H Holly Spence DO   senna-docusate sodium 1 tablet Oral HS Holly Spence DO   warfarin 2 mg Oral Once per day on Sun Tue Thu Sat Holly Spence DO   [START ON 12/2/2019] warfarin 3 mg Oral Once per day on Mon Wed Fri Holly Spence DO        Today, Patient Was Seen By: Holly Spence DO    ** Please Note: This note has been constructed using a voice recognition system   **

## 2019-12-01 NOTE — PHYSICAL THERAPY NOTE
PT EVALUATION 10:09-10:35 ( 26 minutes)    80 y o     [id number]    Back pain [M54 9]  Weakness [R53 1]  Ambulatory dysfunction [R26 2]    Past Medical History:   Diagnosis Date    Abnormal blood chemistry     last assessed: 09/20/14    Asthma     Breast cancer ([facility] ) 08/2008    COPD (chronic obstructive pulmonary disease) (HCC)     DVT (deep venous thrombosis) (HCC)     Edema     last assessed: 06/25/15    Gait disturbance     last assessed: 05/14/14    Herpes zoster     last assessed: 11/20/12    Malignant neoplasm of breast (female) [facility])     last assessed: 10/31/12    Open wound of ankle     last assessed: 07/29/15    Pulmonary embolism ([facility] ) 07/2011         Past Surgical History:   Procedure Laterality Date    NO PAST SURGERIES          12/01/19 1009   Note Type   Note type Eval/Treat   Home Living   Type of [address] [place] [address] One level  (1+1 MERLE)   Bathroom Shower/Tub   (sponge bathes)   Bathroom Toilet Raised  (by BSC over top)   [address]  (rollator  Thinks may have transport chair )   Additional Comments resides with dtr in ranch style home  dtr home with pt as does not work  Dtr does not drives  Son and dtr in law are supprotive and 3 blocks away  Dtr has own physical limitations  Prior Function   Level of [place] Independent with ADLs and functional mobility   Lives With Daughter   Receives Help From Family   ADL Assistance Independent   IADLs Needs assistance   Falls in the last 6 months 1 to 4   Vocational Retired   Comments I with use RW in house  Primarily household distances  If goes out uses transport chair  Restrictions/Precautions   Weight Bearing Precautions Per Order No   Other Precautions Fall Risk;Pain;Cognitive; Chair Alarm; Bed Alarm;O2;WBS   General   Additional Pertinent History Pt is 81 y/o female s/p fall with + pelvic fx  WBAT and PT consulted with activity as tolerated orders  Family/Caregiver Present No   Cognition   Overall Cognitive Status Impaired   Arousal/Participation Cooperative   Orientation Level Oriented to person;Oriented to place;Oriented to time;Oriented X4   Memory Decreased short term memory   Following Commands Follows one step commands with increased time or repetition   Comments Redirection needed to tasks  Anxious   RUE Assessment   RUE Assessment WFL  (grossly 4-/5)   LUE Assessment   LUE Assessment WFL  (grossly 4-/5)   RLE Assessment   RLE Assessment X  (+ pain)   Strength RLE   R Hip Flexion 3-/5   R Knee Flexion 3+/5   R Knee Extension 3+/5   R Ankle Dorsiflexion 4-/5   R Ankle Plantar Flexion 4-/5   LLE Assessment   LLE Assessment WFL  (grossly 4-/5)   Light Touch   RLE Light Touch Grossly intact   LLE Light Touch Grossly intact   Bed Mobility   Supine to Sit 2  Maximal assistance   Additional items Assist x 1; Increased time required;Verbal cues;LE management;HOB elevated; Bedrails   Additional Comments significant increase in time to complete transitions  /51 EOB  O2 sats on 2L 92%   Transfers   Sit to Stand 2  Maximal assistance   Additional items Assist x 1   Stand to Sit 2  Maximal assistance   Additional items Assist x 1; Increased time required;Verbal cues   Additional Comments significant increase in time to complete transition  Flexed posture  Poor UE WB, narrow stance and postural limitations( increased kyphosis, L knee valgus)   Ambulation/Elevation   Gait pattern Improper Weight shift; Forward Flexion;Decreased R stance;Poor UE support   Gait Assistance 2  Maximal assist   Additional items Assist x 1   Assistive Device Rolling walker   Distance 0 ft  Unable to advance LEs  Balance   Static Sitting Fair   Dynamic Sitting Fair -   Static Standing Poor   Dynamic Standing Poor -   Ambulatory Zero   Endurance Deficit   Endurance Deficit Yes   Endurance Deficit Description BARNARD, fatigue, weakness, pain       Activity Tolerance   Activity Tolerance Patient limited by fatigue;Patient limited by pain;Treatment limited secondary to medical complications (Comment)   Medical Staff Made Aware Nurse, Rahul Nj  PCA Yana   Nurse Made Aware yes   Assessment   Prognosis Fair   Problem List Decreased strength;Decreased range of motion;Decreased endurance; Impaired balance;Decreased mobility; Decreased cognition; Impaired judgement;Decreased safety awareness;Orthopedic restrictions;Pain;Decreased skin integrity   Assessment Pt is 81 y/o female admitted p fall at home   + acute displaced R inferior pubic bone and nondisplaced fracture of R superior pubic bone  PT consulted  WBAT and activity as tolerated per ortho consult  Prior to admission resides with dtr in ranch style home  Ambulates with RW primarily household distances  Use of transport chair when going out with family  Daughter is retired but is limited in ability to provide physical support to pt  Currently presents with functional limitations related to impairments in strength, balance, activity tolerance,posture, joint integrity,  pain, overall mobility,  and ambulation  Requires max A for bed mobility, transfers and attempt at ambulation  Unsuccessful with ambulation with use of RW due to difficulty with WB to RLE, decreased balance, poor upright posture and use of UE for offloading  Given impairments and need for increased assistance with functional decline from baseline, will require ongoing skilled PT to address impairments and optimize outcomes  At this time STR is recommended  See progress note following eval for mobility training  Goals   Patient Goals "get out of here"   STG Expiration Date 12/15/19   Short Term Goal #1 14 days: 1)  Pt will perform bed mobility with Crow demonstrating appropriate technique 100% of the time in order to improve function  2)  Perform all transfers with Crow demonstrating safe and appropriate technique 100% of the time in order to improve ability to negotiate safely in home environment with less reliance on caregiver  3) Amb with least restrictive AD > 50'x1 with Crow in order to demonstrate ability to negotiate in home environment with less reliance on caregiver  4)  Improve overall strength and balance 1/2 grade in order to optimize ability to perform functional tasks and reduce fall risk  5) Increase activity tolerance to 30 minutes in order to improve endurance to functional tasks  6) PT for ongoing patient and family/caregiver education, DME needs and d/c planning in order to promote highest level of function in least restrictive environment  PT Treatment Day 1   Plan   Treatment/Interventions Functional transfer training;LE strengthening/ROM; Therapeutic exercise; Endurance training;Patient/family training;Equipment eval/education;Gait training;Bed mobility; Compensatory technique education;Continued evaluation;Spoke to nursing   PT Frequency Other (Comment)  (4-6x/wk)   Recommendation   Recommendation Short-term skilled PT   Equipment Recommended Walker   PT - OK to Discharge   (yes to rhb when medically stable  NO to home )   Barthel Index   Feeding 5   Bathing 0   Grooming Score 0   Dressing Score 0   Bladder Score 5   Bowels Score 5   Toilet Use Score 5   Transfers (Bed/Chair) Score 5   Mobility (Level Surface) Score 0   Stairs Score 0   Barthel Index Score 25     History: co - morbidities, fall risk, use of assistive device, assist for adl's, cognition, multiple lines  Exam: impairments in locomotion, musculoskeletal, balance,posture, joint integrity, skin integrity,  pulmonary, cognition , barthel 25  Clinical: unstable/unpredictable ( fall risk, pain, increased O2 from baseline, WBS, chair/bed alarm, cog, increased assist from baseline)  Complexity:high    Cameron Rajan, PT     Time In: 1035  Time Out:1105  Total Time: 30 minutes      S:  "I feel as though I need to have a BM "  O:  Sit<>stand with max A of 2  ( performed sit to stand transfers x 3 with rests in between each)  Standing tolerance x 2 minutes ( performed 2 times)  /61  O2 sats on 2L range 87-92%  + BARNARD  Able to amb 3 lateral steps with RW with max A of 2, unable to progress beyond  Sit to supine with max A of 2  Rolling R<>L with max A of 2  A:  Seen for progression of mobility  Continues to require significant assistance for all mobility  Cues for technique, posture, UE use and LE advancement  Attempted to progress OOB to chair with RW support  Pivots on LEs to advance, unable to clear feet to take steps toward chair  BARNARD,fatigue and weakness limit progress to chair at this time  Returned to supine at pt's request   Given severity of functional limitation with pelvic fx, will require STR at d/c to optimize outcomes  P:  PT per POC      Hoa Saldana, PT

## 2019-12-01 NOTE — PLAN OF CARE
Problem: PHYSICAL THERAPY ADULT  Goal: Performs mobility at highest level of function for planned discharge setting  See evaluation for individualized goals  Description  Treatment/Interventions: Functional transfer training, LE strengthening/ROM, Therapeutic exercise, Endurance training, Patient/family training, Equipment eval/education, Gait training, Bed mobility, Compensatory technique education, Continued evaluation, Spoke to nursing  Equipment Recommended: Gustavo Meeks       See flowsheet documentation for full assessment, interventions and recommendations  Outcome: Progressing  Note:   Prognosis: Fair  Problem List: Decreased strength, Decreased range of motion, Decreased endurance, Impaired balance, Decreased mobility, Decreased cognition, Impaired judgement, Decreased safety awareness, Orthopedic restrictions, Pain, Decreased skin integrity  Assessment: Pt is 79 y/o female admitted p fall at home   + acute displaced R inferior pubic bone and nondisplaced fracture of R superior pubic bone  PT consulted  WBAT and activity as tolerated per ortho consult  Prior to admission resides with dtr in ranch style home  Ambulates with RW primarily household distances  Use of transport chair when going out with family  Daughter is retired but is limited in ability to provide physical support to pt  Currently presents with functional limitations related to impairments in strength, balance, activity tolerance,posture, joint integrity,  pain, overall mobility,  and ambulation  Requires max A for bed mobility, transfers and attempt at ambulation  Unsuccessful with ambulation with use of RW due to difficulty with WB to RLE, decreased balance, poor upright posture and use of UE for offloading  Given impairments and need for increased assistance with functional decline from baseline, will require ongoing skilled PT to address impairments and optimize outcomes  At this time STR is recommended    See progress note following eval for mobility training  Recommendation: Short-term skilled PT     PT - OK to Discharge: (yes to rhb when medically stable  NO to home )    See flowsheet documentation for full assessment

## 2019-12-02 NOTE — PROGRESS NOTES
Progress Note - [place] 6/24/1925, 80 y o  female MRN: [id number]    Unit/Bed#: E5 -01 Encounter: [id number]    Primary Care Provider: Geovany Marte DO   Date and time admitted to hospital: 11/28/2019  6:57 PM        * Fall at home, initial encounter  Toya Valljeo  S/p Fall; on warfarin, denies hitting her head  C/o hip pain and inability to ambulate   Imaging revealed acute displaced fracture of right inferior pubic bone, nondisplaced fracture of right superior pubic  -orthopedic surgery consultation appreciated, non operable, weight-bearing as tolerated  -PT OT recommending rehab  -continue with pain control    Acute respiratory failure with hypoxia (HCC)  Assessment & Plan  H/o COPD and asthma, not on home O2  Denies SOB  No acute exacerbation  O2 sat 88%, improved to 96% with NC  CXR negative for acute cardiopulmonary disease  CT:  Patchy ill-defined opacities in the right lower lobe with somewhat linear configuration favoring scarring/atelectasis  Triangular-shaped opacity in this region measuring 10 mm, presumed to be related to scarring  · Low suspicion for PE given already on anticoagulation with therapeutic INR  · Will continue to attempt to wean off    Moderate protein-calorie malnutrition (HCC)  Assessment & Plan  Malnutrition Findings: BMI Findings: Body mass index is 21 77 kg/m²  · Nutrition consult    Onychomycosis  Assessment & Plan  Elongated toenails  Podiatry consult    Cyst of right ovary  Assessment & Plan  Incidental finding on CT: Incidental Rt adnexal unilocular cystic mass measuring 4 8 x 5 3 x 5 7 cm      This was discussed with patient and son  Pelvic ultrasound as an outpatient    SIRS (systemic inflammatory response syndrome) (HCC)  Assessment & Plan  Meets SIRS criteria with tachycardia and leukocytosis; likely stress reactive  Monitor for fever  Monitor WBC    Ambulatory dysfunction  Assessment & Plan  Difficulty ambulating and bearing weight after fall  PT OT consult    Essential hypertension  Assessment & Plan  Remotely on lisinopril, was discontinued by PMD due to normal BP readings  Monitor BP    Anxiety  Assessment & Plan  Maintained on p r n  Lorazepam 1mg BID PRN, continue  Verified on PDMP    Factor V Leiden ([address] )  Assessment & Plan  Maintained on warfarin 3mg qd, continue  INR 2 1   Monitor INR    COPD (chronic obstructive pulmonary disease) ([facility])  Assessment & Plan  No acute exacerbation, not maintained on home O2  Pt stopped maintenance inhalers due to side effects; continue albuterol neb b i d  and p r n  Albuterol HFA          VTE Pharmacologic Prophylaxis:   Pharmacologic:  Warfarin    Patient Centered Rounds: I have performed bedside rounds with nursing staff today  Time Spent for Care: 20 minutes  More than 50% of total time spent on counseling and coordination of care as described above  Current Length of Stay: 4 day(s)    Current Patient Status: Inpatient   Certification Statement: The patient will continue to require additional inpatient hospital stay due to Awaiting rehab placement    Discharge Plan / Estimated Discharge Date: TBD    Code Status: Level 1 - Full Code      Subjective:   Patient seen and examined at bedside, complains of occasional dyspnea, denies any chest pain, palpitations  Objective:     Vitals:   Temp (24hrs), Av 4 °F (36 9 °C), Min:98 2 °F (36 8 °C), Max:98 6 °F (37 °C)    Temp:  [98 2 °F (36 8 °C)-98 6 °F (37 °C)] 98 3 °F (36 8 °C)  HR:  [] 94  Resp:  [18-20] 18  BP: (115-127)/(57-88) 127/57  SpO2:  [89 %-95 %] 94 %  Body mass index is 21 77 kg/m²  Input and Output Summary (last 24 hours):        Intake/Output Summary (Last 24 hours) at 2019 1329  Last data filed at 2019 0851  Gross per 24 hour   Intake 120 ml   Output    Net 120 ml       Physical Exam:    Constitutional: Patient is oriented to person, place and time, no acute distress  HEENT:  Normocephalic, atraumatic, EOMI, PERRLA, no scleral icterus, no pallor, moist oral mucosa  Neck:  Supple, no masses, no thyromegaly, no bruits Normal range of motion  Lymph nodes:  No lymphadenopathy  Cardiovascular: Normal S1S2, RRR, No murmurs/rubs/gallops appreciated  Pulmonary:  Bilateral air entry, No rhonchi/rales/wheezing appreciated  Abdominal: Soft, Bowel sounds present, Non-tender, Non-distended, No rebound/guarding, no hepatomegaly   Musculoskeletal: No tenderness/abnormality   Extremities:  No cyanosis, clubbing or edema  Peripheral pulses palpable and equal bilaterally  Neurological: Cranial nerves II-XII grossly intact, sensation intact, otherwise no focal neurological symptoms  Skin: Skin is warm and dry, no rashes  Additional Data:     Labs:    Results from last 7 days   Lab Units 12/02/19  0530  11/29/19  0545   WBC Thousand/uL 8 46   < > 11 12*   HEMOGLOBIN g/dL 11 4*   < > 11 6   HEMATOCRIT % 36 4   < > 37 0   PLATELETS Thousands/uL 187   < > 121*   NEUTROS PCT %  --   --  73   LYMPHS PCT %  --   --  10*   MONOS PCT %  --   --  13*   EOS PCT %  --   --  3    < > = values in this interval not displayed  Results from last 7 days   Lab Units 12/01/19  0508   POTASSIUM mmol/L 4 0   CHLORIDE mmol/L 104   CO2 mmol/L 28   BUN mg/dL 34*   CREATININE mg/dL 0 97   CALCIUM mg/dL 8 3     Results from last 7 days   Lab Units 12/02/19  0541   INR  2 76*        I Have Reviewed All Lab Data Listed Above  Recent Cultures (last 7 days):     Results from last 7 days   Lab Units 12/01/19  0931   BLOOD CULTURE  Received in Microbiology Lab  Culture in Progress         Last 24 Hours Medication List:     Current Facility-Administered Medications:  acetaminophen 650 mg Oral Q6H PRN Perlie Damme, CRNP   albuterol 2 puff Inhalation Q6H PRN Perlie Damme, CRNP   albuterol 2 5 mg Nebulization BID Felisaie Damme, CRNP   aluminum-magnesium hydroxide-simethicone 30 mL Oral Q6H PRN Perlie Damme, CRNP   atorvastatin 20 mg Oral Daily Marshall Merchant Bertha Ego, CRNP   benzonatate 100 mg Oral TID PRN Emily Schwab, CRNP   docusate sodium 100 mg Oral BID Rashmi Ivan, DO   HYDROmorphone 0 2 mg Intravenous Q4H PRN Emily Schwab, CRNP   lisinopril 10 mg Oral Daily Rashmi Ivan, DO   LORazepam 1 mg Oral Q12H PRN Emily Schwab, CRNP   ondansetron 4 mg Intravenous Q6H PRN Emily Schwab, CRNP   oxyCODONE 2 5 mg Oral Q4H PRN Emily Schwab, CRNP   oxyCODONE 5 mg Oral Q4H PRN Rashmi Ivan, DO   polyethylene glycol 17 g Oral Daily PRN Rashmi Ivan, DO   salmeterol 1 puff Inhalation Q12H Rashmi Ivan, DO   senna-docusate sodium 1 tablet Oral HS Rashmi Ivan DO   warfarin 2 mg Oral Once per day on Sun Tue Thu Sat Rashmi Ivan, DO   warfarin 3 mg Oral Once per day on Mon Wed Fri Rashmi Ivan,         Today, Patient Was Seen By: Melissa Boyd MD

## 2019-12-02 NOTE — PLAN OF CARE
Problem: Potential for Falls  Goal: Patient will remain free of falls  Description  INTERVENTIONS:  - Assess patient frequently for physical needs  -  Identify cognitive and physical deficits and behaviors that affect risk of falls  -  [place] fall precautions as indicated by assessment   - Educate patient/family on patient safety including physical limitations  - Instruct patient to call for assistance with activity based on assessment  - Modify environment to reduce risk of injury  - Consider OT/PT consult to assist with strengthening/mobility  Outcome: Progressing     Problem: Prexisting or High Potential for Compromised Skin Integrity  Goal: Skin integrity is maintained or improved  Description  INTERVENTIONS:  - Identify patients at risk for skin breakdown  - Assess and monitor skin integrity  - Assess and monitor nutrition and hydration status  - Monitor labs   - Assess for incontinence   - Turn and reposition patient  - Assist with mobility/ambulation  - Relieve pressure over bony prominences  - Avoid friction and shearing  - Provide appropriate hygiene as needed including keeping skin clean and dry  - Evaluate need for skin moisturizer/barrier cream  - Collaborate with interdisciplinary team   - Patient/family teaching  - Consider wound care consult   Outcome: Progressing     Problem: Nutrition/Hydration-ADULT  Goal: Nutrient/Hydration intake appropriate for improving, restoring or maintaining nutritional needs  Description  Monitor and assess patient's nutrition/hydration status for malnutrition  Collaborate with interdisciplinary team and initiate plan and interventions as ordered  Monitor patient's weight and dietary intake as ordered or per policy  Utilize nutrition screening tool and intervene as necessary  Determine patient's food preferences and provide high-protein, high-caloric foods as appropriate       INTERVENTIONS:  - Monitor oral intake, urinary output, labs, and treatment plans  - Assess nutrition and hydration status and recommend course of action  - Evaluate amount of meals eaten  - Assist patient with eating if necessary   - Allow adequate time for meals  - Recommend/ encourage appropriate diets, oral nutritional supplements, and vitamin/mineral supplements  - Order, calculate, and assess calorie counts as needed  - Recommend, monitor, and adjust tube feedings and TPN/PPN based on assessed needs  - Assess need for intravenous fluids  - Provide specific nutrition/hydration education as appropriate  - Include patient/family/caregiver in decisions related to nutrition  Outcome: Progressing     Problem: PAIN - ADULT  Goal: Verbalizes/displays adequate comfort level or baseline comfort level  Description  Interventions:  - Encourage patient to monitor pain and request assistance  - Assess pain using appropriate pain scale  - Administer analgesics based on type and severity of pain and evaluate response  - Implement non-pharmacological measures as appropriate and evaluate response  - Consider cultural and social influences on pain and pain management  - Notify physician/advanced practitioner if interventions unsuccessful or patient reports new pain  Outcome: Progressing     Problem: SAFETY ADULT  Goal: Maintain or return to baseline ADL function  Description  INTERVENTIONS:  -  Assess patient's ability to carry out ADLs; assess patient's baseline for ADL function and identify physical deficits which impact ability to perform ADLs (bathing, care of mouth/teeth, toileting, grooming, dressing, etc )  - Assess/evaluate cause of self-care deficits   - Assess range of motion  - Assess patient's mobility; develop plan if impaired  - Assess patient's need for assistive devices and provide as appropriate  - Encourage maximum independence but intervene and supervise when necessary  - Involve family in performance of ADLs  - Assess for home care needs following discharge   - Consider OT consult to assist with ADL evaluation and planning for discharge  - Provide patient education as appropriate  Outcome: Progressing  Goal: Maintain or return mobility status to optimal level  Description  INTERVENTIONS:  - Assess patient's baseline mobility status (ambulation, transfers, stairs, etc )    - Identify cognitive and physical deficits and behaviors that affect mobility  - Identify mobility aids required to assist with transfers and/or ambulation (gait belt, sit-to-stand, lift, walker, cane, etc )  - [place] fall precautions as indicated by assessment  - Record patient progress and toleration of activity level on Mobility SBAR; progress patient to next Phase/Stage  - Instruct patient to call for assistance with activity based on assessment  - Consider rehabilitation consult to assist with strengthening/weightbearing, etc   Outcome: Progressing     Problem: DISCHARGE PLANNING  Goal: Discharge to home or other facility with appropriate resources  Description  INTERVENTIONS:  - Identify barriers to discharge w/patient and caregiver  - Arrange for needed discharge resources and transportation as appropriate  - Identify discharge learning needs (meds, wound care, etc )  - Arrange for interpretive services to assist at discharge as needed  - Refer to Case Management Department for coordinating discharge planning if the patient needs post-hospital services based on physician/advanced practitioner order or complex needs related to functional status, cognitive ability, or social support system  Outcome: Progressing     Problem: Knowledge Deficit  Goal: Patient/family/caregiver demonstrates understanding of disease process, treatment plan, medications, and discharge instructions  Description  Complete learning assessment and assess knowledge base    Interventions:  - Provide teaching at level of understanding  - Provide teaching via preferred learning methods  Outcome: Progressing     Problem: MUSCULOSKELETAL - ADULT  Goal: Maintain or return mobility to safest level of function  Description  INTERVENTIONS:  - Assess patient's ability to carry out ADLs; assess patient's baseline for ADL function and identify physical deficits which impact ability to perform ADLs (bathing, care of mouth/teeth, toileting, grooming, dressing, etc )  - Assess/evaluate cause of self-care deficits   - Assess range of motion  - Assess patient's mobility  - Assess patient's need for assistive devices and provide as appropriate  - Encourage maximum independence but intervene and supervise when necessary  - Involve family in performance of ADLs  - Assess for home care needs following discharge   - Consider OT consult to assist with ADL evaluation and planning for discharge  - Provide patient education as appropriate  Outcome: Progressing  Goal: Maintain proper alignment of affected body part  Description  INTERVENTIONS:  - Support, maintain and protect limb and body alignment  - Provide patient/ family with appropriate education  Outcome: Progressing

## 2019-12-02 NOTE — TRANSPORTATION MEDICAL NECESSITY
Section I - General Information    Name of Patient: Alonzo Millan                 : 1925    Medicare #: [id number]  Transport Date: 19 (PCS is valid for round trips on this date and for all repetitive trips in the 60-day range as noted below )  Origin: [address]                                                         Destination: [facility]  Is the pt's stay covered under Medicare Part A (PPS/DRG)   [x]     Closest appropriate facility? If no, why is transport to more distant facility required? Yes  If hospice pt, is this transport related to pt's terminal illness? NA       Section II - Medical Necessity Questionnaire  Ambulance transportation is medically necessary only if other means of transport are contraindicated or would be potentially harmful to the patient  To meet this requirement, the patient must either be "bed confined" or suffer from a condition such that transport by means other than ambulance is contraindicated by the patient's condition  The following questions must be answered by the medical professional signing below for this form to be valid:    1)  Describe the MEDICAL CONDITION (physical and/or mental) of this patient AT [address] [place] [address] that requires the patient to be transported in an ambulance and why transport by other means is contraindicated by the patient's condition: hip fracture  superior pubic rami fracture  2) Is the patient "bed confined" as defined below? Yes  To be "be confined" the patient must satisfy all three of the following conditions: (1) unable to get up from bed without Assistance; AND (2) unable to ambulate; AND (3) unable to sit in a chair or wheelchair  3) Can this patient safely be transported by car or wheelchair van (i e , seated during transport without a medical attendant or monitoring)?    No    4) In addition to completing questions 1-3 above, please check any of the following conditions that apply*:   *Note: supporting documentation for any boxes checked must be maintained in the patient's medical records  If hosp-hosp transfer, describe services needed at 2nd facility not available at 1st facility? Unable to tolerate seated position for time needed to transport  non healed factures    Section III - Signature of Physician or Healthcare Professional  I certify that the above information is true and correct based on my evaluation of this patient, and represent that the patient requires transport by ambulance and that other forms of transport are contraindicated  I understand that this information will be used by the Centers for Medicare and Medicaid Services (CMS) to support the determination of medical necessity for ambulance services, and I represent that I have personal knowledge of the patient's condition at time of transport  []  If this box is checked, I also certify that the patient is physically or mentally incapable of signing the ambulance service's claim and that the institution with which I am affiliated has furnished care, services, or assistance to the patient  My signature below is made on behalf of the patient pursuant to 42 CFR §424 36(b)(4)  In accordance with 42 CFR §424 37, the specific reason(s) that the patient is physically or mentally incapable of signing the claim form is as follows: N/A      Signature of Physician* or Healthcare Professional______________________________________________________________  Signature Date 12/02/19 (For scheduled repetitive transports, this form is not valid for transports performed more than 60 days after this date)    Printed Name & Credentials of Physician or Healthcare Professional (MD, DO, RN, etc )__________________Regi Owens MS______________  *Form must be signed by patient's attending physician for scheduled, repetitive transports   For non-repetitive, unscheduled ambulance transports, if unable to obtain the signature of the attending physician, any of the following may sign (choose appropriate option below)  [] Physician Assistant []  Clinical Nurse Specialist []  Registered Nurse  []  Nurse Practitioner  [x] Discharge Planner

## 2019-12-02 NOTE — ASSESSMENT & PLAN NOTE
S/p Fall; on warfarin, denies hitting her head   C/o hip pain and inability to ambulate   Imaging revealed acute displaced fracture of right inferior pubic bone, nondisplaced fracture of right superior pubic  -orthopedic surgery consultation appreciated, non operable, weight-bearing as tolerated  -PT OT recommending rehab  -continue with pain control

## 2019-12-02 NOTE — PHYSICAL THERAPY NOTE
PT PROGRESS NOTE    Name: Marc Rey  AGE: 80 y o  MRN: [id number]  LENGTH OF STAY: 4             12/02/19 1225   Pain Assessment   Pain Score 2   Pain Type Acute pain   Pain Location Back   Pain Orientation Posterior;Right   Hospital Pain Intervention(s) Medication (See MAR); Repositioned; Ambulation/increased activity; Emotional support; Rest   Restrictions/Precautions   Weight Bearing Precautions Per Order Yes   RLE Weight Bearing Per Order WBAT   LLE Weight Bearing Per Order WBAT   Other Precautions Cognitive; Chair Alarm; Bed Alarm; Fall Risk;O2;Pain   General   Chart Reviewed Yes   Response to Previous Treatment Patient reporting fatigue but able to participate  Family/Caregiver Present No   Cognition   Overall Cognitive Status Impaired   Arousal/Participation Alert   Attention Attends with cues to redirect   Orientation Level Oriented to person;Oriented to place   Following Commands Follows one step commands with increased time or repetition   Subjective   Subjective Pt require encouragement to participate in therapy  Bed Mobility   Supine to Sit 3  Moderate assistance   Additional items Assist x 2;HOB elevated; Bedrails; Increased time required;Verbal cues;LE management   Sit to Supine 3  Moderate assistance   Additional items Assist x 2; Increased time required;Verbal cues;LE management   Additional Comments cues for techniques & safety; able to sit at EOB approx  10mins   Transfers   Sit to Stand 2  Maximal assistance   Additional items Assist x 2;Bedrails; Increased time required;Verbal cues   Stand to Sit 2  Maximal assistance   Additional items Assist x 2; Increased time required;Verbal cues   Additional Comments cues for techniques & safety   Ambulation/Elevation   Gait pattern Improper Weight shift;Decreased foot clearance;Narrow SANDRA; Decreased R stance;Shuffling; Short stride; Step to;Excessively slow  (unsteady; difficulty advancing LE)   Gait Assistance 2  Maximal assist   Additional items Assist x 2;Verbal cues; Tactile cues   Assistive Device Rolling walker   Distance 2-3 short lateral steps to [facility] but unable to tolerate amb from bed to chair   Balance   Static Sitting Fair   Static Standing Poor   Ambulatory Poor -   Endurance Deficit   Endurance Deficit Yes   Endurance Deficit Description SOB; fatigue; weakness   Activity Tolerance   Activity Tolerance Patient limited by fatigue;Patient limited by pain   Medical Staff [facility]   Nurse Made Aware RN [facility]   Exercises   TKR Supine;10 reps;AAROM; Bilateral   Assessment   Prognosis Fair   Problem List Decreased strength;Decreased endurance; Impaired balance;Decreased mobility; Decreased safety awareness;Decreased cognition; Impaired judgement;Pain   Assessment Pt seen for PT per POC  Pt continue to require extensive assistance w/ overall mobility w/ most difficulty w/ amb  Pt require modAx2 for bed mobility & maxAx2 for transfers & amb w/ RW + cues for techniques & safety  See above levels of assistance required for all functional tasks  Pt require several attempts prior to being able to take few lateral steps to [facility] 2* to SOB, fatigue & pain  Pt require seated rests in between amb trials  Pt able to tolerate lateral steps to [facility] but unable to tolerate further amb from bed to chair 2* to poor amb balance & tolerance  Gait slow & unsteady w/ inc difficulty advancing LE 2* to impaired wt shifting  Pt tolerated above mentioned thera  ex well, AAROM  Will recommend Nsg staff to use quick move device for safe OOB transfers  No dizziness reported t/o session  (+) SOB during mobility but relieved w/ rest  SpO2 93% w/ 1L O2 NC  Nsg staff most recent vital signs as follows: /57 (BP Location: Left arm)   Pulse 94   Temp 98 3 °F (36 8 °C) (Temporal)   Resp 18   Wt 54 kg (119 lb 0 8 oz)   LMP  (LMP Unknown)   SpO2 94%   BMI 21 77 kg/m²   Will continue PT per POC   At end of session, pt back in bed w/o issues, call bell & phone in reach, bed alarm activated  Fall precautions reinforced w/ good understanding  Will continue to recommend inpt rehab at D/C  CM to follow  Nsg staff to continue to mobilized pt (OOB in chair for all meals & ambulate in room/unit) as tolerated to prevent decline in function  Nsg notified  Goals   Patient Goals to get better   STG Expiration Date 12/15/19   PT Treatment Day 2   Plan   Treatment/Interventions Functional transfer training;LE strengthening/ROM; Therapeutic exercise; Endurance training;Patient/family training;Gait training;Bed mobility;Spoke to nursing;OT   Progress Slow progress, decreased activity tolerance   PT Frequency Other (Comment)  (4-6x/wk)   Recommendation   Recommendation Short-term skilled PT   Equipment Recommended Walker; Other (Comment)  (Nsg staff may use quick move device for safe OOB transfers)   PT - OK to Discharge Yes  (when medically cleared)   Andreas Loja, PT

## 2019-12-02 NOTE — OCCUPATIONAL THERAPY NOTE
[address] Evaluation     Patient Name: Joanna Sarah  [id number] Date: 12/2/2019  Problem List  Principal Problem:    Fall at home, initial encounter  Active Problems:    COPD (chronic obstructive pulmonary disease) ([facility] )    Factor V Leiden ([facility] )    Anxiety    Essential hypertension    Ambulatory dysfunction    Acute respiratory failure with hypoxia (HCC)    SIRS (systemic inflammatory response syndrome) (HCC)    Cyst of right ovary    Moderate protein-calorie malnutrition ([facility] )    Past Medical History  Past Medical History:   Diagnosis Date    Abnormal blood chemistry     last assessed: 09/20/14    Asthma     Breast cancer ([facility] ) 08/2008    COPD (chronic obstructive pulmonary disease) (HCC)     D[facility]deep venous thrombosis) ([facility])     Edema     last assessed: 06/25/15    Gait disturbance     last assessed: 05/14/14    Herpes zoster     last assessed: 11/20/12    Malignant neoplasm of breast (female) [facility])     last assessed: 10/31/12    Open wound of ankle     last assessed: 07/29/15    Pulmonary embolism ([place] [id number] ) 07/2011     Past Surgical History  Past Surgical History:   Procedure Laterality Date    NO PAST SURGERIES             12/02/19 1206   Note Type   Note type Eval only   Restrictions/Precautions   Weight Bearing Precautions Per Order Yes   RLE Weight Bearing Per Order WBAT   LLE Weight Bearing Per Order WBAT   Other Precautions Cognitive; Chair Alarm; Bed Alarm; Fall Risk;O2;Pain;Multiple lines   Pain Assessment   Pain Assessment 0-10   Pain Score 2   Pain Type Acute pain   Pain Location Back   Pain Orientation Right;Posterior   Hospital Pain Intervention(s) Repositioned; Ambulation/increased activity; Emotional support; Rest   Response to Interventions Tolerated OT   Multiple Pain Sites No   Home Living   Type of Home House   Home Layout One level  (1+1 MERLE)   Bathroom Shower/Tub   (sponge bathes)   Bathroom Toilet Raised  (BSC over top)   Bathroom Equipment Commode   Bathroom Accessibility Accessible   Home Equipment Walker; Other (Comment)  (Rollator, transport chair)   Additional Comments Pt lives with dtr in a ranch style home with 1+1 MERLE  In addition to dtr, pt reports local son and dtr-in-law who live 3 blocks away   Prior Function   Level of [place] Independent with ADLs and functional mobility; Needs assistance with IADLs   Lives With Daughter   Receives Help From Family   ADL Assistance Independent   IADLs Needs assistance   Falls in the last 6 months 1 to 4   Vocational Retired   Comments At baseline, pt was I w/ ADLs and functional mobility/transfers w/ use of RW for household distances and transport chair for community distances, required assist w/ IADLs, (-) , and reports 1 fall PTA  Lifestyle   Autonomy At baseline, pt was I w/ ADLs and functional mobility/transfers w/ use of RW for household distances and transport chair for community distances, required assist w/ IADLs, (-) , and reports 1 fall PTA  Reciprocal Relationships Dtr, son, dtr-in-law   Service to Others Retired   Intrinsic Gratification Watching TV   Psychosocial   Psychosocial (WDL) WDL   ADL   Where Assessed Edge of bed   Eating Assistance 5  Supervision/Setup   Grooming Assistance 5  Supervision/Setup   UB Bathing Assistance 4  Minimal Assistance   LB [address] 3  Moderate Assistance   [address] 4  Minimal Assistance    [facility] 2  Maximal [address]  3  Moderate Assistance   Functional Assistance 3  Moderate Assistance   Bed Mobility   Supine to Sit 3  Moderate assistance   Additional items Assist x 2;HOB elevated; Bedrails; Increased time required;Verbal cues;LE management   Sit to Supine 3  Moderate assistance   Additional items Assist x 2; Increased time required;Verbal cues;LE management   Additional Comments Pt lying supine at end of session with PT present   All needs met and pt reports no further questions for OT at this time   Transfers Sit to Stand 2  Maximal assistance   Additional items Assist x 2;Bedrails; Increased time required;Verbal cues   Stand to Sit 2  Maximal assistance   Additional items Assist x 2; Increased time required;Verbal cues   Additional Comments Cues for safe technique and hand placement; Increased time required to achieve full standing position   Functional Mobility   Functional Mobility 2  Maximal assistance   Additional Comments Assist x2; 2-3 lateral steps towards [facility]   Additional items Rolling walker   Balance   Static Sitting Fair   Dynamic Sitting Fair -   Static Standing Poor   Dynamic Standing Poor -   Ambulatory Poor -   Activity Tolerance   Activity Tolerance Patient limited by fatigue;Patient limited by pain   Medical Staff Made Aware VAISHALI Gibson   Nurse Made Aware yes;  Gilford Downs, RN   RUE Assessment   RUE Assessment WFL   RUE Strength   RUE Overall Strength Within Functional Limits - able to perform ADL tasks with strength  (4-/5 throughout)   LUE Assessment   LUE Assessment WFL   LUE Strength   LUE Overall Strength Within Functional Limits - able to perform ADL tasks with strength  (4-/5 throughout)   Hand Function   Gross Motor Coordination Functional   Fine Motor Coordination Functional   Sensation   Light Touch No apparent deficits   Proprioception   Proprioception No apparent deficits   Vision - Complex Assessment   Ocular Range of Motion WFL   Acuity Able to read clock/calendar on wall without difficulty   Perception   Inattention/Neglect Appears intact   Cognition   Overall Cognitive Status Impaired   Arousal/Participation Alert   Attention Attends with cues to redirect   Orientation Level Oriented to person;Oriented to place;Oriented to time   Memory Decreased recall of precautions;Decreased recall of recent events;Decreased short term memory   Following Commands Follows one step commands with increased time or repetition   Comments Anxious, requiring redirection to task and conversation   Assessment Limitation Decreased ADL status; Decreased UE strength;Decreased Safe judgement during ADL;Decreased cognition;Decreased endurance;Decreased self-care trans;Decreased high-level ADLs   Prognosis Good   Assessment Pt is a 80 y o  female seen for OT evaluation s/p adm to [facility] on 11/28/2019 s/p fall with c/o hip pain and inability to ambulate  Imaging indicated R superior pubic rami fracture  Pt w/ orders for WBAT B/L LEs  Comorbidities affecting pts functional performance include a significant PMH of Asthma, DVT, COPD, Breast cancer, and pulmonary embolism  Pt with active OT orders and activity orders for Activity as tolerated  Pt lives with dtr in a ranch style home with 1+1 MERLE  At baseline, pt was I w/ ADLs and functional mobility/transfers w/ use of RW for household distances and transport chair for community distances, required assist w/ IADLs, (-) , and reports 1 fall PTA  Upon evaluation, pt currently requires Min A for UB ADLs, Max-Mod A for LB ADLs, Mod A for toileting, Mod A of 2 for bed mobility, and Max A of 2 for functional mobility/transfers 2* the following deficits impacting occupational performance: weakness, decreased strength, decreased balance, decreased tolerance, impaired memory, impaired problem solving, decreased safety awareness and increased pain  These impairments, as well at pts steps to enter environment, difficulty performing ADLS and limited insight into deficits limit pts ability to safely engage in all baseline areas of occupation  Pt scored overall 30/100 on the Barthel Index  Based on the aforementioned OT evaluation, functional performance deficits, and assessments, pt has been identified as a High complexity evaluation   Pt to continue to benefit from continued acute OT services during hospital stay to address defined deficits and to maximize level of functional independence in the following Occupational Performance areas: grooming, bathing/shower, toilet hygiene, dressing, medication management, functional mobility, community mobility, clothing management and social participation  From OT standpoint, recommend STR upon D/C  OT will continue to follow pt 3-5x/wk to address the following goals to  w/in 10-14 days:   Goals   Patient Goals To get better   LTG Time Frame 10-   Long Term Goal Please refer to LTGs listed below   Plan   Treatment Interventions ADL retraining;Functional transfer training;UE strengthening/ROM; Endurance training;Patient/family training;Equipment evaluation/education; Compensatory technique education; Energy conservation;Continued evaluation; Activityengagement   Goal Expiration Date 19   OT Treatment Day 0   OT Frequency 3-5x/wk   Recommendation   OT Discharge Recommendation Short Term Rehab   OT - OK to Discharge Yes  (when medically cleared to rehab)   Barthel Index   Feeding 5   Bathing 0   Grooming Score 0   Dressing Score 5   Bladder Score 5   Bowels Score 5   Toilet Use Score 5   Transfers (Bed/Chair) Score 5   Mobility (Level Surface) Score 0   Stairs Score 0   Barthel Index Score 30   Modified Kingsbury Scale   Modified Kingsbury Scale 4          GOALS    1) Pt will improve activity tolerance to G for min 30 min txment sessions for increase engagement in functional tasks    2) Pt will complete bed mobility at a Supervision level w/ G balance/safety demonstrated to decrease caregiver assistance required     3) Pt will complete UB/LB dressing/self care w/ Supervision using adaptive device and DME as needed    4) Pt will complete bathing w/ Supervision w/ use of AE and DME as needed    5) Pt will complete toileting w/ Supervision w/ G hygiene/thoroughness using DME as needed    6) Pt will improve functional transfers to Supervision on/off all surfaces using DME as needed w/ G balance/safety     7) Pt will improve functional mobility during ADL/IADL/leisure tasks to Supervision using DME as needed w/ G balance/safety     8) Pt will be attentive 100% of the time during ongoing cognitive assessment w/ G participation to assist w/ safe d/c planning/recommendations    9) Pt will demonstrate G carryover of pt/caregiver education and training as appropriate w/o cues w/ good tolerance to increase safety during functional tasks    10) Pt will demonstrate 100% carryover of energy conservation techniques t/o functional I/ADL/leisure tasks w/o cues s/p skilled education to increase endurance during functional tasks    11) Pt will increase BUE strength by 1MM grade via AROM/AAROM/PROM exercises to increase independence in ADLs and transfers    12) Pt will verbalize 3 potential fall hazards and identify appropriate compensatory techniques to decrease fall risk in home environment         Felix Brito OTR/L

## 2019-12-02 NOTE — SOCIAL WORK
Pt was accepted at [facility]  CM arranged SLETS BLS tomorrow at 1 PM     CM informed pt's son Douglas Owusu of  time  Numbers for report in Facility Contact

## 2019-12-02 NOTE — ASSESSMENT & PLAN NOTE
H/o COPD and asthma, not on home O2  Denies SOB  No acute exacerbation  O2 sat 88%, improved to 96% with NC  CXR negative for acute cardiopulmonary disease  CT:  Patchy ill-defined opacities in the right lower lobe with somewhat linear configuration favoring scarring/atelectasis    Triangular-shaped opacity in this region measuring 10 mm, presumed to be related to scarring  · Low suspicion for PE given already on anticoagulation with therapeutic INR  · Will continue to attempt to wean off

## 2019-12-02 NOTE — PLAN OF CARE
Problem: PHYSICAL THERAPY ADULT  Goal: Performs mobility at highest level of function for planned discharge setting  See evaluation for individualized goals  Description  Treatment/Interventions: Functional transfer training, LE strengthening/ROM, Therapeutic exercise, Endurance training, Patient/family training, Equipment eval/education, Gait training, Bed mobility, Compensatory technique education, Continued evaluation, Spoke to nursing  Equipment Recommended: Sly Jacobs       See flowsheet documentation for full assessment, interventions and recommendations  Outcome: Progressing  Note:   Prognosis: Fair  Problem List: Decreased strength, Decreased endurance, Impaired balance, Decreased mobility, Decreased safety awareness, Decreased cognition, Impaired judgement, Pain  Assessment: Pt seen for PT per POC  Pt continue to require extensive assistance w/ overall mobility w/ most difficulty w/ amb  Pt require modAx2 for bed mobility & maxAx2 for transfers & amb w/ RW + cues for techniques & safety  See above levels of assistance required for all functional tasks  Pt require several attempts prior to being able to take few lateral steps to [facility] 2* to SOB, fatigue & pain  Pt require seated rests in between amb trials  Pt able to tolerate lateral steps to [facility] but unable to tolerate further amb from bed to chair 2* to poor amb balance & tolerance  Gait slow & unsteady w/ inc difficulty advancing LE 2* to impaired wt shifting  Pt tolerated above mentioned thera  ex well, AAROM  Will recommend Ns staff to use quick move device for safe OOB transfers  No dizziness reported t/o session  (+) SOB during mobility but relieved w/ rest  SpO2 93% w/ 1L O2 NC  Nsg staff most recent vital signs as follows: /57 (BP Location: Left arm)   Pulse 94   Temp 98 3 °F (36 8 °C) (Temporal)   Resp 18   Wt 54 kg (119 lb 0 8 oz)   LMP  (LMP Unknown)   SpO2 94%   BMI 21 77 kg/m²   Will continue PT per POC   At end of session, pt back in bed w/o issues, call bell & phone in reach, bed alarm activated  Fall precautions reinforced w/ good understanding  Will continue to recommend inpt rehab at D/C  CM to follow  Nsg staff to continue to mobilized pt (OOB in chair for all meals & ambulate in room/unit) as tolerated to prevent decline in function  Nsg notified  Recommendation: Short-term skilled PT     PT - OK to Discharge: Yes(when medically cleared)    See flowsheet documentation for full assessment

## 2019-12-02 NOTE — SOCIAL WORK
CM met with pt at bedside to discuss rehab and she asked that CM call her son Lance Rios  CM called and discussed rehab  A post acute care recommendation was made by your care team for STR  Discussed Freedom of Choice with caregiver  List of agencies given to caregiver over the phone  caregiver aware the list is custom filtered for them by zip code location and that [facility] post acute providers are designated  He chose [facility] and  [facility]  Referral sent  CM did discuss [facility] and [address] with him, however he felt pt would need slower paced rehab  CM following

## 2019-12-02 NOTE — PLAN OF CARE
Problem: OCCUPATIONAL THERAPY ADULT  Goal: Performs self-care activities at highest level of function for planned discharge setting  See evaluation for individualized goals  Description  Treatment Interventions: ADL retraining, Functional transfer training, UE strengthening/ROM, Endurance training, Patient/family training, Equipment evaluation/education, Compensatory technique education, Energy conservation, Continued evaluation, Activityengagement          See flowsheet documentation for full assessment, interventions and recommendations  Note:   Limitation: Decreased ADL status, Decreased UE strength, Decreased Safe judgement during ADL, Decreased cognition, Decreased endurance, Decreased self-care trans, Decreased high-level ADLs  Prognosis: Good  Assessment: Pt is a 80 y o  female seen for OT evaluation s/p adm to [facility] [address] on 11/28/2019 s/p fall with c/o hip pain and inability to ambulate  Imaging indicated R superior pubic rami fracture  Pt w/ orders for WBAT B/L LEs  Comorbidities affecting pts functional performance include a significant PMH of Asthma, DVT, COPD, Breast cancer, and pulmonary embolism  Pt with active OT orders and activity orders for Activity as tolerated  Pt lives with dtr in a ranch style home with 1+1 MERLE  At baseline, pt was I w/ ADLs and functional mobility/transfers w/ use of RW for household distances and transport chair for community distances, required assist w/ IADLs, (-) , and reports 1 fall PTA  Upon evaluation, pt currently requires Min A for UB ADLs, Max-Mod A for LB ADLs, Mod A for toileting, Mod A of 2 for bed mobility, and Max A of 2 for functional mobility/transfers 2* the following deficits impacting occupational performance: weakness, decreased strength, decreased balance, decreased tolerance, impaired memory, impaired problem solving, decreased safety awareness and increased pain   These impairments, as well at pts steps to enter environment, difficulty performing ADLS and limited insight into deficits limit pts ability to safely engage in all baseline areas of occupation  Pt scored overall 30/100 on the Barthel Index  Based on the aforementioned OT evaluation, functional performance deficits, and assessments, pt has been identified as a High complexity evaluation  Pt to continue to benefit from continued acute OT services during hospital stay to address defined deficits and to maximize level of functional independence in the following Occupational Performance areas: grooming, bathing/shower, toilet hygiene, dressing, medication management, functional mobility, community mobility, clothing management and social participation  From OT standpoint, recommend STR upon D/C   OT will continue to follow pt 3-5x/wk to address the following goals to  w/in 10-14 days:     OT Discharge Recommendation: Short Term Rehab  OT - OK to Discharge: Yes(when medically cleared to rehab)

## 2019-12-02 NOTE — ASSESSMENT & PLAN NOTE
Incidental finding on CT: Incidental Rt adnexal unilocular cystic mass measuring 4 8 x 5 3 x 5 7 cm      This was discussed with patient and son  Pelvic ultrasound as an outpatient

## 2019-12-03 NOTE — NURSING NOTE
Reviewed discharge instructions with patient, family, and transport team  Report given to receiving facility  Patient demonstrated understanding  Will continue to monitor until discharge

## 2019-12-03 NOTE — DISCHARGE SUMMARY
Discharge- Alonzo Millan 6/24/1925, 80 y o  female MRN: [id number]    Unit/Bed#: E5 -01 Encounter: [id number]    Primary Care Provider: John Huff DO   Date and time admitted to hospital: 11/28/2019  6:57 PM        * Fall at home, initial encounter  [address] [place] [address]  S/p Fall; on warfarin, denies hitting her head  C/o hip pain and inability to ambulate   Imaging revealed acute displaced fracture of right inferior pubic bone, nondisplaced fracture of right superior pubic  -orthopedic surgery consultation appreciated, non operable, weight-bearing as tolerated  -PT OT recommending rehab  -continue with pain control    Acute respiratory failure with hypoxia (HCC)  Assessment & Plan  H/o COPD and asthma, not on home O2  Denies SOB  No acute exacerbation  O2 sat 88%, improved to 96% with NC  CXR negative for acute cardiopulmonary disease  CT:  Patchy ill-defined opacities in the right lower lobe with somewhat linear configuration favoring scarring/atelectasis  Triangular-shaped opacity in this region measuring 10 mm, presumed to be related to scarring  · Low suspicion for PE given already on anticoagulation with therapeutic INR  · Will continue to attempt to wean off    Moderate protein-calorie malnutrition (HCC)  Assessment & Plan  Malnutrition Findings: BMI Findings: Body mass index is 21 77 kg/m²  · Nutrition consult    Cyst of right ovary  Assessment & Plan  Incidental finding on CT: Incidental Rt adnexal unilocular cystic mass measuring 4 8 x 5 3 x 5 7 cm      This was discussed with patient and son  Pelvic ultrasound as an outpatient    SIRS (systemic inflammatory response syndrome) (HCC)  Assessment & Plan  Meets SIRS criteria with tachycardia and leukocytosis; likely stress reactive  Monitor for fever  Monitor WBC    Ambulatory dysfunction  Assessment & Plan  Difficulty ambulating and bearing weight after fall  Discharged to rehab    Essential hypertension  Assessment & Plan  Remotely on lisinopril, was discontinued by PMD due to normal BP readings    Anxiety  Assessment & Plan  Maintained on p r n  Lorazepam 1mg BID PRN, continue  Verified on PDMP    Factor V Leiden ([facility] )  Assessment & Plan  Maintained on warfarin 3mg qd, continue  Monitor INR    COPD (chronic obstructive pulmonary disease) (HCC)  Assessment & Plan  No acute exacerbation, not maintained on home O2  Pt stopped maintenance inhalers due to side effects; continue albuterol neb b i d  and p r n  Albuterol HFA      Transition of Care Discharge Summary - [facility]    Patient Information: Tory Mccallum 80 y o  female MRN: [id number]  Unit/Bed#: E5 -01 Encounter: [id number]    Discharging Physician / Practitioner: Rosina Mariee MD  PCP: Devin Owens DO  Admission Date: 11/28/2019  Discharge Date: 12/03/19    Disposition:      [facility] [address] to [facility]:   · Not Applicable to this Patient - Not Applicable to this Patient    Reason for Admission: fall    Discharge Diagnoses:     Principal Problem:    Fall at home, initial encounter  Active Problems:    Acute respiratory failure with hypoxia (HCC)    COPD (chronic obstructive pulmonary disease) ([facility] )    Factor V Leiden ([facility] )    Anxiety    Essential hypertension    Ambulatory dysfunction    SIRS (systemic inflammatory response syndrome) ([facility] )    Cyst of right ovary    Moderate protein-calorie malnutrition ([facility] )  Resolved Problems:    * No resolved hospital problems  *      Consultations During Hospital Stay:  · Ortho  · Podiatry    Procedures Performed:     · Right lower leg wound excisionally debrided    Medication Adjustments and Discharge Medications:  · Medication Dosing Tapers - Please refer to Discharge Medication List for details on any medication dosing tapers (if applicable to patient)  · Discharge Medication List: See after visit summary for reconciled discharge medications       Wound Care Recommendations:  When applicable, please see wound care section of After Visit Summary  Diet Recommendations at Discharge:  Diet -        Diet Orders   (From admission, onward)             Start     Ordered    11/29/19 0923  Room Service  Once     Question:  Type of Service  Answer:  Room Service-Appropriate    11/29/19 9587    11/28/19 2317  Diet Cardiovascular; Cardiac  Diet effective now     Question Answer Comment   Diet Type Cardiovascular    Cardiac Cardiac    RD to adjust diet per protocol? 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  Hospital Course: Natasha Wick is a 80 y o  female patient who originally presented to the hospital on 11/28/2019 due to fall at home  Patient lives at home with her daughter, states she fell at home after her leg gave out  Orthopedic consulted for patient's displaced fracture of right inferior pubic bone, not amenable to surgical intervention  PT OT recommended rehab  Patient and family agreeable  Patient otherwise medically stable for discharge to rehab today with outpatient follow-up      Please see above problem list for further details    Condition at Discharge: good     Discharge Day Visit / Exam:     Subjective:  Patient seen examined at bedside, denies any chest pain, palpitations, dyspnea    Vitals: Blood Pressure: 118/57 (12/03/19 0732)  Pulse: 91 (12/03/19 0732)  Temperature: 98 7 °F (37 1 °C) (12/03/19 0732)  Temp Source: Temporal (12/03/19 0732)  Respirations: 18 (12/03/19 0732)  Weight - Scale: 54 kg (119 lb 0 8 oz) (11/28/19 1859)  SpO2: 96 % (12/03/19 0732)    Physical Exam:    Constitutional: Patient is oriented to person, place and time, no acute distress  HEENT:  Normocephalic, atraumatic, EOMI, PERRLA, no scleral icterus, no pallor, moist oral mucosa  Neck:  Supple, no masses, no thyromegaly, no bruits Normal range of motion  Lymph nodes:  No lymphadenopathy  Cardiovascular: Normal S1S2, RRR, No murmurs/rubs/gallops appreciated  Pulmonary:  Bilateral air entry, No rhonchi/rales/wheezing appreciated  Abdominal: Soft, Bowel sounds present, Non-tender, Non-distended, No rebound/guarding, no hepatomegaly   Musculoskeletal: No tenderness/abnormality   Extremities:  No cyanosis, clubbing or edema  Peripheral pulses palpable and equal bilaterally  Neurological: Cranial nerves II-XII grossly intact, sensation intact, otherwise no focal neurological symptoms  Skin: Skin is warm and dry, no rashes  Discharge instructions/Information to patient and family:   See after visit summary section titled Discharge Instructions for information provided to patient and family  Planned Readmission: no     Discharge Statement:  I spent 30 minutes discharging the patient  This time was spent on the day of discharge  I had direct contact with the patient on the day of discharge  Greater than 50% of the total time was spent examining patient, answering all patient questions, arranging and discussing plan of care with patient as well as directly providing post-discharge instructions  Additional time then spent on discharge activities      ** Please Note: This note has been constructed using a voice recognition system **

## 2019-12-03 NOTE — PLAN OF CARE
Problem: Potential for Falls  Goal: Patient will remain free of falls  Description  INTERVENTIONS:  - Assess patient frequently for physical needs  -  Identify cognitive and physical deficits and behaviors that affect risk of falls  -  [place] fall precautions as indicated by assessment   - Educate patient/family on patient safety including physical limitations  - Instruct patient to call for assistance with activity based on assessment  - Modify environment to reduce risk of injury  - Consider OT/PT consult to assist with strengthening/mobility  12/3/2019 0223 by Finn Cruz  Outcome: Progressing  12/3/2019 0222 by Finn Cruz  Outcome: Progressing     Problem: Prexisting or High Potential for Compromised Skin Integrity  Goal: Skin integrity is maintained or improved  Description  INTERVENTIONS:  - Identify patients at risk for skin breakdown  - Assess and monitor skin integrity  - Assess and monitor nutrition and hydration status  - Monitor labs   - Assess for incontinence   - Turn and reposition patient  - Assist with mobility/ambulation  - Relieve pressure over bony prominences  - Avoid friction and shearing  - Provide appropriate hygiene as needed including keeping skin clean and dry  - Evaluate need for skin moisturizer/barrier cream  - Collaborate with interdisciplinary team   - Patient/family teaching  - Consider wound care consult   12/3/2019 0223 by Finn Cruz  Outcome: Progressing  12/3/2019 0222 by Finn Cruz  Outcome: Progressing     Problem: Nutrition/Hydration-ADULT  Goal: Nutrient/Hydration intake appropriate for improving, restoring or maintaining nutritional needs  Description  Monitor and assess patient's nutrition/hydration status for malnutrition  Collaborate with interdisciplinary team and initiate plan and interventions as ordered  Monitor patient's weight and dietary intake as ordered or per policy  Utilize nutrition screening tool and intervene as necessary   Determine patient's food preferences and provide high-protein, high-caloric foods as appropriate       INTERVENTIONS:  - Monitor oral intake, urinary output, labs, and treatment plans  - Assess nutrition and hydration status and recommend course of action  - Evaluate amount of meals eaten  - Assist patient with eating if necessary   - Allow adequate time for meals  - Recommend/ encourage appropriate diets, oral nutritional supplements, and vitamin/mineral supplements  - Order, calculate, and assess calorie counts as needed  - Recommend, monitor, and adjust tube feedings and TPN/PPN based on assessed needs  - Assess need for intravenous fluids  - Provide specific nutrition/hydration education as appropriate  - Include patient/family/caregiver in decisions related to nutrition  12/3/2019 0223 by Ean Nye  Outcome: Progressing  12/3/2019 0222 by Ean Nye  Outcome: Progressing     Problem: PAIN - ADULT  Goal: Verbalizes/displays adequate comfort level or baseline comfort level  Description  Interventions:  - Encourage patient to monitor pain and request assistance  - Assess pain using appropriate pain scale  - Administer analgesics based on type and severity of pain and evaluate response  - Implement non-pharmacological measures as appropriate and evaluate response  - Consider cultural and social influences on pain and pain management  - Notify physician/advanced practitioner if interventions unsuccessful or patient reports new pain  12/3/2019 0223 by Ean Nye  Outcome: Progressing  12/3/2019 0222 by Ean Nye  Outcome: Progressing     Problem: SAFETY ADULT  Goal: Maintain or return to baseline ADL function  Description  INTERVENTIONS:  -  Assess patient's ability to carry out ADLs; assess patient's baseline for ADL function and identify physical deficits which impact ability to perform ADLs (bathing, care of mouth/teeth, toileting, grooming, dressing, etc )  - Assess/evaluate cause of self-care deficits - Assess range of motion  - Assess patient's mobility; develop plan if impaired  - Assess patient's need for assistive devices and provide as appropriate  - Encourage maximum independence but intervene and supervise when necessary  - Involve family in performance of ADLs  - Assess for home care needs following discharge   - Consider OT consult to assist with ADL evaluation and planning for discharge  - Provide patient education as appropriate  12/3/2019 0223 by Ean Nye  Outcome: Progressing  12/3/2019 0222 by Ean Nye  Outcome: Progressing  Goal: Maintain or return mobility status to optimal level  Description  INTERVENTIONS:  - Assess patient's baseline mobility status (ambulation, transfers, stairs, etc )    - Identify cognitive and physical deficits and behaviors that affect mobility  - Identify mobility aids required to assist with transfers and/or ambulation (gait belt, sit-to-stand, lift, walker, cane, etc )  - [place] fall precautions as indicated by assessment  - Record patient progress and toleration of activity level on Mobility SBAR; progress patient to next Phase/Stage  - Instruct patient to call for assistance with activity based on assessment  - Consider rehabilitation consult to assist with strengthening/weightbearing, etc   12/3/2019 0223 by Ean Nye  Outcome: Progressing  12/3/2019 0222 by Ean Nye  Outcome: Progressing     Problem: DISCHARGE PLANNING  Goal: Discharge to home or other facility with appropriate resources  Description  INTERVENTIONS:  - Identify barriers to discharge w/patient and caregiver  - Arrange for needed discharge resources and transportation as appropriate  - Identify discharge learning needs (meds, wound care, etc )  - Arrange for interpretive services to assist at discharge as needed  - Refer to Case Management Department for coordinating discharge planning if the patient needs post-hospital services based on physician/advanced practitioner order or complex needs related to functional status, cognitive ability, or social support system  12/3/2019 0223 by Jimy Higgins  Outcome: Progressing  12/3/2019 0222 by Jimy Higgins  Outcome: Progressing     Problem: Knowledge Deficit  Goal: Patient/family/caregiver demonstrates understanding of disease process, treatment plan, medications, and discharge instructions  Description  Complete learning assessment and assess knowledge base    Interventions:  - Provide teaching at level of understanding  - Provide teaching via preferred learning methods  12/3/2019 0223 by Jimy Higgins  Outcome: Progressing  12/3/2019 0222 by Jimy Higgins  Outcome: Progressing     Problem: MUSCULOSKELETAL - ADULT  Goal: Maintain or return mobility to safest level of function  Description  INTERVENTIONS:  - Assess patient's ability to carry out ADLs; assess patient's baseline for ADL function and identify physical deficits which impact ability to perform ADLs (bathing, care of mouth/teeth, toileting, grooming, dressing, etc )  - Assess/evaluate cause of self-care deficits   - Assess range of motion  - Assess patient's mobility  - Assess patient's need for assistive devices and provide as appropriate  - Encourage maximum independence but intervene and supervise when necessary  - Involve family in performance of ADLs  - Assess for home care needs following discharge   - Consider OT consult to assist with ADL evaluation and planning for discharge  - Provide patient education as appropriate  12/3/2019 0223 by Jimy Higgins  Outcome: Progressing  12/3/2019 0222 by Jimy Higgins  Outcome: Progressing  Goal: Maintain proper alignment of affected body part  Description  INTERVENTIONS:  - Support, maintain and protect limb and body alignment  - Provide patient/ family with appropriate education  12/3/2019 0223 by Jimy Higgins  Outcome: Progressing  12/3/2019 0222 by Jimy Higgins  Outcome: Progressing

## 2019-12-03 NOTE — PLAN OF CARE
Problem: Potential for Falls  Goal: Patient will remain free of falls  Description  INTERVENTIONS:  - Assess patient frequently for physical needs  -  Identify cognitive and physical deficits and behaviors that affect risk of falls  -  [place] fall precautions as indicated by assessment   - Educate patient/family on patient safety including physical limitations  - Instruct patient to call for assistance with activity based on assessment  - Modify environment to reduce risk of injury  - Consider OT/PT consult to assist with strengthening/mobility  Outcome: Progressing     Problem: Prexisting or High Potential for Compromised Skin Integrity  Goal: Skin integrity is maintained or improved  Description  INTERVENTIONS:  - Identify patients at risk for skin breakdown  - Assess and monitor skin integrity  - Assess and monitor nutrition and hydration status  - Monitor labs   - Assess for incontinence   - Turn and reposition patient  - Assist with mobility/ambulation  - Relieve pressure over bony prominences  - Avoid friction and shearing  - Provide appropriate hygiene as needed including keeping skin clean and dry  - Evaluate need for skin moisturizer/barrier cream  - Collaborate with interdisciplinary team   - Patient/family teaching  - Consider wound care consult   Outcome: Progressing     Problem: Nutrition/Hydration-ADULT  Goal: Nutrient/Hydration intake appropriate for improving, restoring or maintaining nutritional needs  Description  Monitor and assess patient's nutrition/hydration status for malnutrition  Collaborate with interdisciplinary team and initiate plan and interventions as ordered  Monitor patient's weight and dietary intake as ordered or per policy  Utilize nutrition screening tool and intervene as necessary  Determine patient's food preferences and provide high-protein, high-caloric foods as appropriate       INTERVENTIONS:  - Monitor oral intake, urinary output, labs, and treatment plans  - Assess nutrition and hydration status and recommend course of action  - Evaluate amount of meals eaten  - Assist patient with eating if necessary   - Allow adequate time for meals  - Recommend/ encourage appropriate diets, oral nutritional supplements, and vitamin/mineral supplements  - Order, calculate, and assess calorie counts as needed  - Recommend, monitor, and adjust tube feedings and TPN/PPN based on assessed needs  - Assess need for intravenous fluids  - Provide specific nutrition/hydration education as appropriate  - Include patient/family/caregiver in decisions related to nutrition  Outcome: Progressing     Problem: PAIN - ADULT  Goal: Verbalizes/displays adequate comfort level or baseline comfort level  Description  Interventions:  - Encourage patient to monitor pain and request assistance  - Assess pain using appropriate pain scale  - Administer analgesics based on type and severity of pain and evaluate response  - Implement non-pharmacological measures as appropriate and evaluate response  - Consider cultural and social influences on pain and pain management  - Notify physician/advanced practitioner if interventions unsuccessful or patient reports new pain  Outcome: Progressing     Problem: SAFETY ADULT  Goal: Maintain or return to baseline ADL function  Description  INTERVENTIONS:  -  Assess patient's ability to carry out ADLs; assess patient's baseline for ADL function and identify physical deficits which impact ability to perform ADLs (bathing, care of mouth/teeth, toileting, grooming, dressing, etc )  - Assess/evaluate cause of self-care deficits   - Assess range of motion  - Assess patient's mobility; develop plan if impaired  - Assess patient's need for assistive devices and provide as appropriate  - Encourage maximum independence but intervene and supervise when necessary  - Involve family in performance of ADLs  - Assess for home care needs following discharge   - Consider OT consult to assist with ADL evaluation and planning for discharge  - Provide patient education as appropriate  Outcome: Progressing  Goal: Maintain or return mobility status to optimal level  Description  INTERVENTIONS:  - Assess patient's baseline mobility status (ambulation, transfers, stairs, etc )    - Identify cognitive and physical deficits and behaviors that affect mobility  - Identify mobility aids required to assist with transfers and/or ambulation (gait belt, sit-to-stand, lift, walker, cane, etc )  - [place] fall precautions as indicated by assessment  - Record patient progress and toleration of activity level on Mobility SBAR; progress patient to next Phase/Stage  - Instruct patient to call for assistance with activity based on assessment  - Consider rehabilitation consult to assist with strengthening/weightbearing, etc   Outcome: Progressing     Problem: DISCHARGE PLANNING  Goal: Discharge to home or other facility with appropriate resources  Description  INTERVENTIONS:  - Identify barriers to discharge w/patient and caregiver  - Arrange for needed discharge resources and transportation as appropriate  - Identify discharge learning needs (meds, wound care, etc )  - Arrange for interpretive services to assist at discharge as needed  - Refer to Case Management Department for coordinating discharge planning if the patient needs post-hospital services based on physician/advanced practitioner order or complex needs related to functional status, cognitive ability, or social support system  Outcome: Progressing     Problem: Knowledge Deficit  Goal: Patient/family/caregiver demonstrates understanding of disease process, treatment plan, medications, and discharge instructions  Description  Complete learning assessment and assess knowledge base    Interventions:  - Provide teaching at level of understanding  - Provide teaching via preferred learning methods  Outcome: Progressing     Problem: MUSCULOSKELETAL - ADULT  Goal: Maintain or return mobility to safest level of function  Description  INTERVENTIONS:  - Assess patient's ability to carry out ADLs; assess patient's baseline for ADL function and identify physical deficits which impact ability to perform ADLs (bathing, care of mouth/teeth, toileting, grooming, dressing, etc )  - Assess/evaluate cause of self-care deficits   - Assess range of motion  - Assess patient's mobility  - Assess patient's need for assistive devices and provide as appropriate  - Encourage maximum independence but intervene and supervise when necessary  - Involve family in performance of ADLs  - Assess for home care needs following discharge   - Consider OT consult to assist with ADL evaluation and planning for discharge  - Provide patient education as appropriate  Outcome: Progressing  Goal: Maintain proper alignment of affected body part  Description  INTERVENTIONS:  - Support, maintain and protect limb and body alignment  - Provide patient/ family with appropriate education  Outcome: Progressing

## 2019-12-06 NOTE — PROCEDURES
This is an addendum to consultation note from 11/29/19    Wound Debridement:  Excisional debridement of wound was achieved using scissors to remove all fibrotic and nonviable tissue down to healthy, subcutaneous bleeding tissue base   Wound measures 2 5cm in length x 2 5cm in width x 0 5cm in depth

## 2019-12-10 NOTE — PROGRESS NOTES
Orthopaedic Surgery Note    CC: Right Hip Pain      HPI:  Ms Kassie Rodrigues is a 80 y  o female with a history of ground level fall  Presented to ED and radiographs demonstrated pelvic ring fractures  She was hospitalized and then discharged to SNF  She has persistent pain in the pelvis  She describes this as mostly in the groin and worse with movement        ALLERGIES:  Allergies   Allergen Reactions    Dog Epithelium     Cat Hair Extract Sneezing    Ciprofloxacin Hives    Erythromycin Nausea Only    Sulfa Antibiotics Edema       CURRENT MEDICATIONS:  Current Outpatient Medications   Medication Sig Dispense Refill    acetaminophen (TYLENOL) 325 mg tablet Take 2 tablets (650 mg total) by mouth every 6 (six) hours as needed for fever 30 tablet 0    albuterol (2 5 mg/3 mL) 0 083 % nebulizer solution Take 1 vial (2 5 mg total) by nebulization 2 (two) times a day 25 vial 1    albuterol (VENTOLIN HFA) 90 mcg/act inhaler Inhale 2 puffs every 6 (six) hours as needed for wheezing 1 Inhaler 5    benzonatate (TESSALON PERLES) 100 mg capsule Take 1 capsule (100 mg total) by mouth 3 (three) times a day as needed for cough 20 capsule 0    docusate sodium (COLACE) 100 mg capsule Take 1 capsule (100 mg total) by mouth 2 (two) times a day 10 capsule 0    FLOVENT  MCG/ACT inhaler INHALE TWO PUFFS BY MOUTH TWICE DAILY 12 g 0    lisinopril (ZESTRIL) 10 mg tablet Take 10 mg by mouth daily      oxyCODONE (ROXICODONE) 5 mg immediate release tablet Take 0 5 tablets (2 5 mg total) by mouth every 4 (four) hours as needed for moderate pain for up to 10 daysMax Daily Amount: 15 mg 10 tablet 0    Respiratory Therapy Supplies (NEBULIZER COMPRESSOR) KIT by Does not apply route 2 (two) times a day 1 each 0    SEREVENT DISKUS 50 MCG/DOSE diskus inhaler 1 puff every 12 (twelve) hours      atorvastatin (LIPITOR) 20 mg tablet TAKE ONE TABLET BY MOUTH ONCE DAILY  (Patient not taking: Reported on 12/10/2019) 90 tablet 0    LORazepam (ATIVAN) 1 mg tablet Take 1 tablet (1 mg total) by mouth every 12 (twelve) hours as needed for anxiety (Patient not taking: Reported on 12/10/2019) 60 tablet 0    warfarin (COUMADIN) 1 mg tablet Take 3 tabs daily (Patient not taking: Reported on 12/10/2019) 90 tablet 5     No current facility-administered medications for this visit  PAST MEDICAL HISTORY  Past Medical History:   Diagnosis Date    Abnormal blood chemistry     last assessed: 09/20/14    Asthma     Breast cancer ([place] [address] ) 08/2008    COPD (chronic obstructive pulmonary disease) ([place] [address] )     [facility])     Edema     last assessed: 06/25/15    Gait disturbance     last assessed: 05/14/14    Herpes zoster     last assessed: 11/20/12    Malignant neoplasm of breast (female[facility])     last assessed: 10/31/12    Open wound of ankle     last assessed: 07/29/15    Pulmonary embolism ([place] [address] ) 07/2011       SURGICAL HISTORY  Past Surgical History:   Procedure Laterality Date    NO PAST SURGERIES         FAMILY HISTORY  Family History   Problem Relation Age of Onset    Diabetes Mother     Other Daughter         Agoraphobia    Hypertension Daughter     Cervical cancer Family     Emphysema Family        SOCIAL HISTORY  Social History     Socioeconomic History    Marital status:       Spouse name: Not on file    Number of children: Not on file    Years of education: Not on file    Highest education level: Not on file   Occupational History    Not on file   Social Needs    Financial resource strain: Not on file    Food insecurity:     Worry: Not on file     Inability: Not on file    Transportation needs:     Medical: Not on file     Non-medical: Not on file   Tobacco Use    Smoking status: Former Smoker    Smokeless tobacco: Never Used   Substance and Sexual Activity    Alcohol use: No    Drug use: No    Sexual activity: Not on file   Lifestyle    Physical activity:     Days per week: Not on file Minutes per session: Not on file    Stress: Not on file   Relationships    Social connections:     Talks on phone: Not on file     Gets together: Not on file     Attends Denominational service: Not on file     Active member of club or organization: Not on file     Attends meetings of clubs or organizations: Not on file     Relationship status: Not on file    Intimate partner violence:     Fear of current or ex partner: Not on file     Emotionally abused: Not on file     Physically abused: Not on file     Forced sexual activity: Not on file   Other Topics Concern    Not on file   Social History Narrative    Not on file         Physical Exam    Vitals  There were no vitals filed for this visit  BMI  Body mass index is 19 64 kg/m²  GENERAL: No acute distress  On stretcher  HEENT : Normocephalic, atraumatic  Extraocular movements intact  Mucous membranes moist  NECK: Supple, trachea midline  LUNGS: Adequate and symmetric respiratory effort  No intercostal retractions or accessory muscle use  HEART: Extremities warm and perfused  ABDOMEN: Nondistended  SKIN: Warm and dry, no rash  RIGHT Hip Exam  Extension: 0  Flexion: unable  Internal/External Rotation: no pain with logroll  Leg is noted to be similar in length to other leg   Sensation intact distally  Ankle PF, DF intact  Able to slide heel several inches up bed, but then limited by pain  Imaging  A) Imaging modality available  Radiographs: yes  MRI scan: no  CT scan: yes    B) Imaging findings  Plain radiographs obtained in the office today, which demonstrate inferior ramus fracture, not significantly changed compared to prior films  I have reviewed plain radiographs and CT scan obtained 11/28/19 that demonstrates right inferior ramus fracture, which appears acute and possible inferior ramus fracture which appears age indeterminate    Please note that this differs from the radiologist read on the CT scan which notes "no osseous abnormality"  Assessment and Plan  Right Pelvic Ring Injury    - no surgical intervention indicated  - no interval change in fracture displacement  - WBAT  - followup 6 weeks with new radiographs  - recommend pain mgmt          Baldemar Bullock MD  Adult Reconstruction Surgery  Department [address]  4:00 PM

## 2019-12-10 NOTE — PROGRESS NOTES
Patients son Lance Rios called and left message on voicemail  Would like to talk to Dr Richie Myers about mothers care and discharge from [place] [id number]

## 2019-12-11 NOTE — PROGRESS NOTES
I tried to call her son Douglas Owusu at [phone]  I left a voicemail  I will try to calm at the end of the day unless there is something he needs from me before that, he should call the office  I also have an e-mail that I am responding to

## 2019-12-17 NOTE — TELEPHONE ENCOUNTER
Kade Silverman stating she would like to request that you order a gel hospital mattress  Pt unable to change position by herself and is at high risk for skin break down please fax to [facility] [phone]   Any questions call Adarsh Boykin

## 2019-12-18 NOTE — TELEPHONE ENCOUNTER
I had spoke to estiven Esteves [facility] does not do labs  The only problem is I can not get anyone for 48 hrs or longer  I did lm at [employer] to see if they can go on Friday but on their form it says house calls will scheduled with in one week of receipt  Hnl, for sure will probably be next week also  [facility] has not called me back  Can this wait until next week?

## 2019-12-23 NOTE — TELEPHONE ENCOUNTER
Order was created for specialty mattress and faxed to [facility] attention at [facility], [phone]   Received confirmation fax went through

## 2020-01-01 ENCOUNTER — TELEPHONE (OUTPATIENT)
Dept: FAMILY MEDICINE CLINIC | Facility: CLINIC | Age: 85
End: 2020-01-01

## 2020-01-01 ENCOUNTER — APPOINTMENT (EMERGENCY)
Dept: RADIOLOGY | Facility: HOSPITAL | Age: 85
End: 2020-01-01
Payer: MEDICARE

## 2020-01-01 ENCOUNTER — HOSPITAL ENCOUNTER (EMERGENCY)
Facility: HOSPITAL | Age: 85
End: 2020-02-11
Attending: EMERGENCY MEDICINE | Admitting: EMERGENCY MEDICINE
Payer: MEDICARE

## 2020-01-01 ENCOUNTER — APPOINTMENT (EMERGENCY)
Dept: CT IMAGING | Facility: HOSPITAL | Age: 85
End: 2020-01-01
Payer: MEDICARE

## 2020-01-01 VITALS
WEIGHT: 111.55 LBS | DIASTOLIC BLOOD PRESSURE: 34 MMHG | BODY MASS INDEX: 18.56 KG/M2 | RESPIRATION RATE: 142 BRPM | OXYGEN SATURATION: 77 % | TEMPERATURE: 99.9 F | HEART RATE: 148 BPM | SYSTOLIC BLOOD PRESSURE: 68 MMHG

## 2020-01-01 DIAGNOSIS — R79.1 SUPRATHERAPEUTIC INR: ICD-10-CM

## 2020-01-01 DIAGNOSIS — J45.30 MILD PERSISTENT ASTHMA WITHOUT COMPLICATION: ICD-10-CM

## 2020-01-01 DIAGNOSIS — I46.9 CARDIAC ARREST (HCC): Primary | ICD-10-CM

## 2020-01-01 DIAGNOSIS — D62 ACUTE BLOOD LOSS ANEMIA: ICD-10-CM

## 2020-01-01 DIAGNOSIS — M25.551 RIGHT HIP PAIN: Primary | ICD-10-CM

## 2020-01-01 DIAGNOSIS — E78.2 COMBINED HYPERLIPIDEMIA: ICD-10-CM

## 2020-01-01 DIAGNOSIS — F41.9 ANXIETY: ICD-10-CM

## 2020-01-01 DIAGNOSIS — Z91.81 HX OF BAD FALL: ICD-10-CM

## 2020-01-01 DIAGNOSIS — R79.89 ELEVATED LACTIC ACID LEVEL: ICD-10-CM

## 2020-01-01 DIAGNOSIS — N17.9 AKI (ACUTE KIDNEY INJURY) (HCC): ICD-10-CM

## 2020-01-01 DIAGNOSIS — M25.559 HIP PAIN: ICD-10-CM

## 2020-01-01 DIAGNOSIS — T07.XXXA MULTIPLE CONTUSIONS: ICD-10-CM

## 2020-01-01 DIAGNOSIS — J44.9 CHRONIC OBSTRUCTIVE PULMONARY DISEASE, UNSPECIFIED COPD TYPE (HCC): ICD-10-CM

## 2020-01-01 DIAGNOSIS — D68.51 FACTOR V LEIDEN (HCC): ICD-10-CM

## 2020-01-01 LAB
ABO GROUP BLD BPU: NORMAL
ABO GROUP BLD BPU: NORMAL
ALBUMIN SERPL BCP-MCNC: 2.3 G/DL (ref 3.5–5)
ALP SERPL-CCNC: 89 U/L (ref 46–116)
ALT SERPL W P-5'-P-CCNC: 46 U/L (ref 12–78)
ANION GAP SERPL CALCULATED.3IONS-SCNC: 22 MMOL/L (ref 4–13)
ANISOCYTOSIS BLD QL SMEAR: PRESENT
APTT PPP: 67 SECONDS (ref 23–37)
AST SERPL W P-5'-P-CCNC: 60 U/L (ref 5–45)
ATRIAL RATE: 117 BPM
BASOPHILS # BLD MANUAL: 0 THOUSAND/UL (ref 0–0.1)
BASOPHILS NFR MAR MANUAL: 0 % (ref 0–1)
BILIRUB SERPL-MCNC: 1.99 MG/DL (ref 0.2–1)
BPU ID: NORMAL
BPU ID: NORMAL
BUN SERPL-MCNC: 46 MG/DL (ref 5–25)
CALCIUM SERPL-MCNC: 8.8 MG/DL (ref 8.3–10.1)
CHLORIDE SERPL-SCNC: 97 MMOL/L (ref 100–108)
CO2 SERPL-SCNC: 19 MMOL/L (ref 21–32)
CREAT SERPL-MCNC: 1.38 MG/DL (ref 0.6–1.3)
EOSINOPHIL # BLD MANUAL: 0 THOUSAND/UL (ref 0–0.4)
EOSINOPHIL NFR BLD MANUAL: 0 % (ref 0–6)
ERYTHROCYTE [DISTWIDTH] IN BLOOD BY AUTOMATED COUNT: 16.3 % (ref 11.6–15.1)
GFR SERPL CREATININE-BSD FRML MDRD: 33 ML/MIN/1.73SQ M
GLUCOSE SERPL-MCNC: 223 MG/DL (ref 65–140)
HCT VFR BLD AUTO: 21.7 % (ref 34.8–46.1)
HGB BLD-MCNC: 6.3 G/DL (ref 11.5–15.4)
HYPERCHROMIA BLD QL SMEAR: PRESENT
INR PPP: 9.42 (ref 0.84–1.19)
LACTATE SERPL-SCNC: 16.2 MMOL/L (ref 0.5–2)
LYMPHOCYTES # BLD AUTO: 41 % (ref 14–44)
LYMPHOCYTES # BLD AUTO: 9.67 THOUSAND/UL (ref 0.6–4.47)
MACROCYTES BLD QL AUTO: PRESENT
MCH RBC QN AUTO: 30.6 PG (ref 26.8–34.3)
MCHC RBC AUTO-ENTMCNC: 29 G/DL (ref 31.4–37.4)
MCV RBC AUTO: 105 FL (ref 82–98)
MONOCYTES # BLD AUTO: 0.47 THOUSAND/UL (ref 0–1.22)
MONOCYTES NFR BLD: 2 % (ref 4–12)
NEUTROPHILS # BLD MANUAL: 13.44 THOUSAND/UL (ref 1.85–7.62)
NEUTS SEG NFR BLD AUTO: 57 % (ref 43–75)
NRBC BLD AUTO-RTO: 1 /100 WBCS
NT-PROBNP SERPL-MCNC: 1134 PG/ML
OVALOCYTES BLD QL SMEAR: PRESENT
P AXIS: 79 DEGREES
PLATELET # BLD AUTO: 472 THOUSANDS/UL (ref 149–390)
PLATELET BLD QL SMEAR: ABNORMAL
PMV BLD AUTO: 9.9 FL (ref 8.9–12.7)
POLYCHROMASIA BLD QL SMEAR: PRESENT
POTASSIUM SERPL-SCNC: 4.5 MMOL/L (ref 3.5–5.3)
PR INTERVAL: 126 MS
PROCALCITONIN SERPL-MCNC: 0.12 NG/ML
PROT SERPL-MCNC: 6.1 G/DL (ref 6.4–8.2)
PROTHROMBIN TIME: 78.9 SECONDS (ref 11.6–14.5)
QRS AXIS: 61 DEGREES
QRSD INTERVAL: 74 MS
QT INTERVAL: 316 MS
QTC INTERVAL: 440 MS
RBC # BLD AUTO: 2.06 MILLION/UL (ref 3.81–5.12)
SODIUM SERPL-SCNC: 138 MMOL/L (ref 136–145)
T WAVE AXIS: 43 DEGREES
TOTAL CELLS COUNTED SPEC: 100
TROPONIN I SERPL-MCNC: <0.02 NG/ML
UNIT DISPENSE STATUS: NORMAL
UNIT DISPENSE STATUS: NORMAL
UNIT PRODUCT CODE: NORMAL
UNIT PRODUCT CODE: NORMAL
UNIT RH: NORMAL
UNIT RH: NORMAL
VENTRICULAR RATE: 117 BPM
WBC # BLD AUTO: 23.58 THOUSAND/UL (ref 4.31–10.16)

## 2020-01-01 PROCEDURE — 93010 ELECTROCARDIOGRAM REPORT: CPT | Performed by: INTERNAL MEDICINE

## 2020-01-01 PROCEDURE — 80053 COMPREHEN METABOLIC PANEL: CPT | Performed by: EMERGENCY MEDICINE

## 2020-01-01 PROCEDURE — 36415 COLL VENOUS BLD VENIPUNCTURE: CPT | Performed by: EMERGENCY MEDICINE

## 2020-01-01 PROCEDURE — 96365 THER/PROPH/DIAG IV INF INIT: CPT

## 2020-01-01 PROCEDURE — 36556 INSERT NON-TUNNEL CV CATH: CPT | Performed by: EMERGENCY MEDICINE

## 2020-01-01 PROCEDURE — 84145 PROCALCITONIN (PCT): CPT | Performed by: EMERGENCY MEDICINE

## 2020-01-01 PROCEDURE — 87040 BLOOD CULTURE FOR BACTERIA: CPT | Performed by: EMERGENCY MEDICINE

## 2020-01-01 PROCEDURE — 84484 ASSAY OF TROPONIN QUANT: CPT | Performed by: EMERGENCY MEDICINE

## 2020-01-01 PROCEDURE — 96361 HYDRATE IV INFUSION ADD-ON: CPT

## 2020-01-01 PROCEDURE — 99291 CRITICAL CARE FIRST HOUR: CPT | Performed by: EMERGENCY MEDICINE

## 2020-01-01 PROCEDURE — 31500 INSERT EMERGENCY AIRWAY: CPT | Performed by: EMERGENCY MEDICINE

## 2020-01-01 PROCEDURE — 85007 BL SMEAR W/DIFF WBC COUNT: CPT | Performed by: EMERGENCY MEDICINE

## 2020-01-01 PROCEDURE — 85730 THROMBOPLASTIN TIME PARTIAL: CPT | Performed by: EMERGENCY MEDICINE

## 2020-01-01 PROCEDURE — 99291 CRITICAL CARE FIRST HOUR: CPT

## 2020-01-01 PROCEDURE — 83880 ASSAY OF NATRIURETIC PEPTIDE: CPT | Performed by: PHYSICIAN ASSISTANT

## 2020-01-01 PROCEDURE — 92950 HEART/LUNG RESUSCITATION CPR: CPT

## 2020-01-01 PROCEDURE — 96375 TX/PRO/DX INJ NEW DRUG ADDON: CPT

## 2020-01-01 PROCEDURE — 85610 PROTHROMBIN TIME: CPT | Performed by: EMERGENCY MEDICINE

## 2020-01-01 PROCEDURE — 85027 COMPLETE CBC AUTOMATED: CPT | Performed by: EMERGENCY MEDICINE

## 2020-01-01 PROCEDURE — 83605 ASSAY OF LACTIC ACID: CPT | Performed by: EMERGENCY MEDICINE

## 2020-01-01 RX ORDER — SUCCINYLCHOLINE/SOD CL,ISO/PF 100 MG/5ML
100 SYRINGE (ML) INTRAVENOUS ONCE
Status: COMPLETED | OUTPATIENT
Start: 2020-01-01 | End: 2020-01-01

## 2020-01-01 RX ORDER — ETOMIDATE 2 MG/ML
15 INJECTION INTRAVENOUS ONCE
Status: COMPLETED | OUTPATIENT
Start: 2020-01-01 | End: 2020-01-01

## 2020-01-01 RX ORDER — PHYTONADIONE 10 MG/ML
10 INJECTION, EMULSION INTRAMUSCULAR; INTRAVENOUS; SUBCUTANEOUS ONCE
Status: DISCONTINUED | OUTPATIENT
Start: 2020-01-01 | End: 2020-01-01 | Stop reason: HOSPADM

## 2020-01-01 RX ORDER — ALBUTEROL SULFATE 2.5 MG/3ML
2.5 SOLUTION RESPIRATORY (INHALATION) 2 TIMES DAILY
Qty: 50 VIAL | Refills: 1 | Status: SHIPPED | OUTPATIENT
Start: 2020-01-01

## 2020-01-01 RX ORDER — LORAZEPAM 1 MG/1
1 TABLET ORAL EVERY 12 HOURS PRN
Qty: 60 TABLET | Refills: 0 | Status: SHIPPED | OUTPATIENT
Start: 2020-01-01

## 2020-01-01 RX ORDER — LORAZEPAM 1 MG/1
1 TABLET ORAL EVERY 12 HOURS PRN
Qty: 60 TABLET | Refills: 0 | OUTPATIENT
Start: 2020-01-01

## 2020-01-01 RX ORDER — ALBUTEROL SULFATE 90 UG/1
2 AEROSOL, METERED RESPIRATORY (INHALATION) EVERY 6 HOURS PRN
Qty: 1 INHALER | Refills: 0 | Status: SHIPPED | OUTPATIENT
Start: 2020-01-01

## 2020-01-01 RX ORDER — WARFARIN SODIUM 1 MG/1
TABLET ORAL
Qty: 90 TABLET | Refills: 0 | Status: SHIPPED | OUTPATIENT
Start: 2020-01-01

## 2020-01-01 RX ORDER — ALBUTEROL SULFATE 2.5 MG/3ML
2.5 SOLUTION RESPIRATORY (INHALATION) 2 TIMES DAILY
Qty: 25 VIAL | Refills: 0 | OUTPATIENT
Start: 2020-01-01

## 2020-01-01 RX ADMIN — ETOMIDATE 15 MG: 20 INJECTION, SOLUTION INTRAVENOUS at 06:51

## 2020-01-01 RX ADMIN — EPINEPHRINE 1 MG: 0.1 INJECTION, SOLUTION ENDOTRACHEAL; INTRACARDIAC; INTRAVENOUS at 07:38

## 2020-01-01 RX ADMIN — EPINEPHRINE 1 MG: 0.1 INJECTION, SOLUTION ENDOTRACHEAL; INTRACARDIAC; INTRAVENOUS at 07:35

## 2020-01-01 RX ADMIN — SODIUM CHLORIDE 1000 ML: 0.9 INJECTION, SOLUTION INTRAVENOUS at 06:21

## 2020-01-01 RX ADMIN — EPINEPHRINE 1 MG: 0.1 INJECTION, SOLUTION ENDOTRACHEAL; INTRACARDIAC; INTRAVENOUS at 07:29

## 2020-01-01 RX ADMIN — Medication 100 MG: at 06:53

## 2020-01-01 RX ADMIN — EPINEPHRINE 1 MG: 0.1 INJECTION, SOLUTION ENDOTRACHEAL; INTRACARDIAC; INTRAVENOUS at 07:32

## 2020-01-01 RX ADMIN — NOREPINEPHRINE BITARTRATE 3 MCG/MIN: 1 INJECTION INTRAVENOUS at 06:50

## 2020-01-03 NOTE — TELEPHONE ENCOUNTER
Pt notified  Pt states she is having an issue with her current nebulizer machine and needs a new one

## 2020-01-03 NOTE — TELEPHONE ENCOUNTER
Lorazepam was just filled on 12/27, and I cannot refill just one of these RX'es linked together  I would also think she already has a nebulizer machine at home - please check on this  I will send albuterol  Please let me know if she needs a new nebulizer

## 2020-01-21 NOTE — TELEPHONE ENCOUNTER
Occupational Therapy Nurse called and stated she just wanted to let you know Pt is doing extremely well and she is going to continue seeing her once a week  Nurse also faxed a form to you with info

## 2020-01-28 NOTE — TELEPHONE ENCOUNTER
Call son - I received a request to refill meds, but patient has not had an INR in a month  Is she going out to the lab or does the lab come to her home?

## 2020-02-05 NOTE — TELEPHONE ENCOUNTER
Mu Jackson called and states she will see be seeing patient 1 more time this week to assist with getting to the bathroom  Also states she will be doing her re-certification this week and will be faxing everything over when done

## 2020-02-07 NOTE — TELEPHONE ENCOUNTER
Home health nurse called to report a fall, pt has bruises on bilateral arms and R hip  Pt cannot bare weight on R leg  Nurse is requesting an xray

## 2020-02-11 NOTE — ED NOTES
Multiple attempts made at IV access using US, unable to obtain access d/t infiltration of IV        Linh García RN  02/11/20 3745

## 2020-02-11 NOTE — ED NOTES
Dr David Coe at bedside to insert central line d/t limited peripheral access        Jaquelin Redding RN  02/11/20 9501

## 2020-02-11 NOTE — ED NOTES
Pt not gift of life suitable based on age  Reference number and contact  [id number], Raheel Nicole RN  02/11/20 1956

## 2020-02-11 NOTE — ED NOTES
pts IV in left ac infiltrated at this time  NSS was infusing via pressure bag for fluid resuscitation        Vic Raphael RN  02/11/20 7936

## 2020-02-11 NOTE — ED ATTENDING ATTESTATION
2/11/2020  I, Aarti Dillard MD, saw and evaluated the patient  I have discussed the patient with the resident/non-physician practitioner and agree with the resident's/non-physician practitioner's findings, Plan of Care, and MDM as documented in the resident's/non-physician practitioner's note, except where noted  All available labs and Radiology studies were reviewed  I was present for key portions of any procedure(s) performed by the resident/non-physician practitioner and I was immediately available to provide assistance  At this point I agree with the current assessment done in the Emergency Department  I have conducted an independent evaluation of this patient a history and physical is as follows:    79 y/o F presented to the ED for complaint of hip pain  Upon my arrival to evaluate the pt she was unresponsive, hypotensive, and tachycardic  Pt son was at bedside who states that she had fallen wed and complained of hip pain since  Discussed care goals with son who is POA and states she is a full code  Unable to perform review of systems 2/2 AMS  On exam pt was a gcs of 3, tachycardic with out mrg, lungs cta b/l, abd soft nt, contusions on R hip, thigh    MDM: AMS and hypotension with hx of fall- consent obatined for central line/intubation, pt started on levophed, will do ct c/a/p to r/o acute traumatic pathology, xray hip to r/o acute traumatic pathology, cardiac/ivfs, aggressive resuscitation    ED Course         Critical Care Time  Procedures

## 2020-02-11 NOTE — ED PROCEDURE NOTE
PROCEDURE  CriticalCare Time  Performed by: Nahomy Hayward MD  Authorized by: Nahomy Hayward MD     Critical care provider statement:     Critical care time (minutes):  35    Critical care time was exclusive of:  Separately billable procedures and treating other patients and teaching time    Critical care was necessary to treat or prevent imminent or life-threatening deterioration of the following conditions:  Circulatory failure, CNS failure or compromise and cardiac failure    Critical care was time spent personally by me on the following activities:  Blood draw for specimens, obtaining history from patient or surrogate, development of treatment plan with patient or surrogate, discussions with consultants, evaluation of patient's response to treatment, examination of patient, interpretation of cardiac output measurements, ordering and performing treatments and interventions, ordering and review of laboratory studies, ordering and review of radiographic studies, re-evaluation of patient's condition and review of old Lexie Lombardo MD  02/11/20 6839

## 2020-02-11 NOTE — CODE DOCUMENTATION
Dr Beryl Bowers used Cardiac US probe to check activity, no activity noted at this time, asystole on monitor with no pulse  Pts son agreeable at this time to stop care   Time of death 07:44

## 2020-02-11 NOTE — ED NOTES
Spoke with  at this time  Informed  that pt had a unwitnessed fall on Wednesday that the son reports pt slid out of bed onto carpeted floor  Son reports he was able to help the pt back into bed   Son states that pt was evaluated by home PT for c/o right hip and leg pain and pt denied transport to hospital       Lisbeth Desai RN  02/11/20 5197

## 2020-02-11 NOTE — ED NOTES
ED [facility] Shaun Carpio at [address] [facility], [address] [place] [address]  02/11/20 6319

## 2020-02-16 LAB
BACTERIA BLD CULT: NORMAL
BACTERIA BLD CULT: NORMAL

## 2022-07-21 NOTE — ED NOTES
Dr Nikko Torrez at bedside, discussed with pts son to intubate and start central line      Capri Callahan RN  02/11/20 2470 LM for patient to call back and schedule a mediciation f/ appointment with provider.    Chayo Hernandez MA